# Patient Record
Sex: MALE | Race: WHITE | Employment: FULL TIME | URBAN - METROPOLITAN AREA
[De-identification: names, ages, dates, MRNs, and addresses within clinical notes are randomized per-mention and may not be internally consistent; named-entity substitution may affect disease eponyms.]

---

## 2018-12-20 ENCOUNTER — HOSPITAL ENCOUNTER (OUTPATIENT)
Age: 38
Discharge: HOME | End: 2018-12-20

## 2018-12-20 ENCOUNTER — HOSPITAL ENCOUNTER (OUTPATIENT)
Dept: GENERAL RADIOLOGY | Age: 38
Discharge: HOME | End: 2018-12-20

## 2018-12-20 DIAGNOSIS — Z00.00 PHYSICAL EXAM: ICD-10-CM

## 2018-12-20 LAB
ALBUMIN SERPL-MCNC: 4.6 G/DL (ref 3.5–5.1)
ALP BLD-CCNC: 67 U/L (ref 38–126)
ALT SERPL-CCNC: 44 U/L (ref 11–66)
AST SERPL-CCNC: 31 U/L (ref 5–40)
BACTERIA: ABNORMAL
BASOPHILS # BLD: 1.1 %
BASOPHILS ABSOLUTE: 0.1 THOU/MM3 (ref 0–0.1)
BILIRUB SERPL-MCNC: 0.4 MG/DL (ref 0.3–1.2)
BILIRUBIN URINE: ABNORMAL
BLOOD, URINE: NEGATIVE
BUN BLDV-MCNC: 19 MG/DL (ref 7–22)
CALCIUM SERPL-MCNC: 9.8 MG/DL (ref 8.5–10.5)
CASTS: ABNORMAL /LPF
CASTS: ABNORMAL /LPF
CHARACTER, URINE: CLEAR
CHLORIDE BLD-SCNC: 100 MEQ/L (ref 98–111)
CHOLESTEROL, TOTAL: 179 MG/DL (ref 100–199)
CO2: 26 MEQ/L (ref 23–33)
COLOR: YELLOW
CREAT SERPL-MCNC: 0.8 MG/DL (ref 0.4–1.2)
CRYSTALS: ABNORMAL
EOSINOPHIL # BLD: 1.3 %
EOSINOPHILS ABSOLUTE: 0.1 THOU/MM3 (ref 0–0.4)
EPITHELIAL CELLS, UA: ABNORMAL /HPF
ERYTHROCYTE [DISTWIDTH] IN BLOOD BY AUTOMATED COUNT: 12.4 % (ref 11.5–14.5)
ERYTHROCYTE [DISTWIDTH] IN BLOOD BY AUTOMATED COUNT: 41.2 FL (ref 35–45)
GLUCOSE BLD-MCNC: 84 MG/DL (ref 70–108)
GLUCOSE, URINE: NEGATIVE MG/DL
HCT VFR BLD CALC: 49.9 % (ref 42–52)
HDLC SERPL-MCNC: 38 MG/DL
HEMOGLOBIN: 17 GM/DL (ref 14–18)
IMMATURE GRANS (ABS): 0.01 THOU/MM3 (ref 0–0.07)
IMMATURE GRANULOCYTES: 0.2 %
KETONES, URINE: NEGATIVE
LDL CHOLESTEROL CALCULATED: 125 MG/DL
LEUKOCYTE ESTERASE, URINE: NEGATIVE
LYMPHOCYTES # BLD: 37.9 %
LYMPHOCYTES ABSOLUTE: 1.8 THOU/MM3 (ref 1–4.8)
MCH RBC QN AUTO: 31.1 PG (ref 26–33)
MCHC RBC AUTO-ENTMCNC: 34.1 GM/DL (ref 32.2–35.5)
MCV RBC AUTO: 91.2 FL (ref 80–94)
MISCELLANEOUS LAB TEST RESULT: ABNORMAL
MONOCYTES # BLD: 10.1 %
MONOCYTES ABSOLUTE: 0.5 THOU/MM3 (ref 0.4–1.3)
NITRITE, URINE: NEGATIVE
NUCLEATED RED BLOOD CELLS: 0 /100 WBC
PH UA: 5.5
PLATELET # BLD: 233 THOU/MM3 (ref 130–400)
PMV BLD AUTO: 9.9 FL (ref 9.4–12.4)
POTASSIUM SERPL-SCNC: 4.5 MEQ/L (ref 3.5–5.2)
PROSTATE SPECIFIC ANTIGEN: 0.5 NG/ML (ref 0–1)
PROTEIN UA: NEGATIVE MG/DL
RBC # BLD: 5.47 MILL/MM3 (ref 4.7–6.1)
RBC URINE: ABNORMAL /HPF
RENAL EPITHELIAL, UA: ABNORMAL
SEG NEUTROPHILS: 49.4 %
SEGMENTED NEUTROPHILS ABSOLUTE COUNT: 2.4 THOU/MM3 (ref 1.8–7.7)
SODIUM BLD-SCNC: 140 MEQ/L (ref 135–145)
SPECIFIC GRAVITY UA: 1.02 (ref 1–1.03)
TOTAL PROTEIN: 7.9 G/DL (ref 6.1–8)
TRIGL SERPL-MCNC: 79 MG/DL (ref 0–199)
UROBILINOGEN, URINE: 0.2 EU/DL
WBC # BLD: 4.8 THOU/MM3 (ref 4.8–10.8)
WBC UA: ABNORMAL /HPF
YEAST: ABNORMAL

## 2019-05-01 ENCOUNTER — HOSPITAL ENCOUNTER (OUTPATIENT)
Age: 39
Setting detail: OBSERVATION
Discharge: HOME | End: 2019-05-02
Attending: EMERGENCY MEDICINE | Admitting: UROLOGY
Payer: COMMERCIAL

## 2019-05-01 ENCOUNTER — APPOINTMENT (OUTPATIENT)
Dept: CT IMAGING | Age: 39
End: 2019-05-01
Payer: COMMERCIAL

## 2019-05-01 DIAGNOSIS — N20.0 KIDNEY STONE: Primary | ICD-10-CM

## 2019-05-01 DIAGNOSIS — N39.0 URINARY TRACT INFECTION WITHOUT HEMATURIA, SITE UNSPECIFIED: ICD-10-CM

## 2019-05-01 LAB
ALBUMIN SERPL-MCNC: 4.2 G/DL (ref 3.5–5.1)
ALP BLD-CCNC: 69 U/L (ref 38–126)
ALT SERPL-CCNC: 41 U/L (ref 11–66)
AMORPHOUS: ABNORMAL
AMPHETAMINE+METHAMPHETAMINE URINE SCREEN: NEGATIVE
ANION GAP SERPL CALCULATED.3IONS-SCNC: 15 MEQ/L (ref 8–16)
AST SERPL-CCNC: 31 U/L (ref 5–40)
BACTERIA: ABNORMAL /HPF
BARBITURATE QUANTITATIVE URINE: NEGATIVE
BASOPHILS # BLD: 0.7 %
BASOPHILS ABSOLUTE: 0.1 THOU/MM3 (ref 0–0.1)
BENZODIAZEPINE QUANTITATIVE URINE: NEGATIVE
BILIRUB SERPL-MCNC: 0.4 MG/DL (ref 0.3–1.2)
BILIRUBIN DIRECT: < 0.2 MG/DL (ref 0–0.3)
BILIRUBIN URINE: NEGATIVE
BLOOD, URINE: NEGATIVE
BUN BLDV-MCNC: 21 MG/DL (ref 7–22)
CALCIUM SERPL-MCNC: 9.2 MG/DL (ref 8.5–10.5)
CANNABINOID QUANTITATIVE URINE: NEGATIVE
CASTS 2: ABNORMAL /LPF
CASTS UA: ABNORMAL /LPF
CHARACTER, URINE: CLEAR
CHLORIDE BLD-SCNC: 104 MEQ/L (ref 98–111)
CO2: 20 MEQ/L (ref 23–33)
COCAINE METABOLITE QUANTITATIVE URINE: NEGATIVE
COLOR: YELLOW
CREAT SERPL-MCNC: 0.9 MG/DL (ref 0.4–1.2)
CRYSTALS, UA: ABNORMAL
EOSINOPHIL # BLD: 0.8 %
EOSINOPHILS ABSOLUTE: 0.1 THOU/MM3 (ref 0–0.4)
EPITHELIAL CELLS, UA: ABNORMAL /HPF
ERYTHROCYTE [DISTWIDTH] IN BLOOD BY AUTOMATED COUNT: 12.3 % (ref 11.5–14.5)
ERYTHROCYTE [DISTWIDTH] IN BLOOD BY AUTOMATED COUNT: 40.1 FL (ref 35–45)
GFR SERPL CREATININE-BSD FRML MDRD: > 90 ML/MIN/1.73M2
GLUCOSE BLD-MCNC: 115 MG/DL (ref 70–108)
GLUCOSE URINE: NEGATIVE MG/DL
HCT VFR BLD CALC: 46.6 % (ref 42–52)
HEMOGLOBIN: 16.2 GM/DL (ref 14–18)
IMMATURE GRANS (ABS): 0.01 THOU/MM3 (ref 0–0.07)
IMMATURE GRANULOCYTES: 0.1 %
KETONES, URINE: NEGATIVE
LEUKOCYTE ESTERASE, URINE: ABNORMAL
LIPASE: 29.2 U/L (ref 5.6–51.3)
LYMPHOCYTES # BLD: 42 %
LYMPHOCYTES ABSOLUTE: 3.2 THOU/MM3 (ref 1–4.8)
MAGNESIUM: 1.9 MG/DL (ref 1.6–2.4)
MCH RBC QN AUTO: 30.9 PG (ref 26–33)
MCHC RBC AUTO-ENTMCNC: 34.8 GM/DL (ref 32.2–35.5)
MCV RBC AUTO: 88.8 FL (ref 80–94)
MISCELLANEOUS 2: ABNORMAL
MONOCYTES # BLD: 8.5 %
MONOCYTES ABSOLUTE: 0.7 THOU/MM3 (ref 0.4–1.3)
MUCUS: ABNORMAL
NITRITE, URINE: NEGATIVE
NUCLEATED RED BLOOD CELLS: 0 /100 WBC
OPIATES, URINE: POSITIVE
OSMOLALITY CALCULATION: 281.4 MOSMOL/KG (ref 275–300)
OXYCODONE: NEGATIVE
PH UA: 6 (ref 5–9)
PHENCYCLIDINE QUANTITATIVE URINE: NEGATIVE
PLATELET # BLD: 258 THOU/MM3 (ref 130–400)
PMV BLD AUTO: 9.7 FL (ref 9.4–12.4)
POTASSIUM SERPL-SCNC: 3.7 MEQ/L (ref 3.5–5.2)
PROTEIN UA: NEGATIVE
RBC # BLD: 5.25 MILL/MM3 (ref 4.7–6.1)
RBC URINE: ABNORMAL /HPF
RENAL EPITHELIAL, UA: ABNORMAL
SEG NEUTROPHILS: 47.9 %
SEGMENTED NEUTROPHILS ABSOLUTE COUNT: 3.7 THOU/MM3 (ref 1.8–7.7)
SODIUM BLD-SCNC: 139 MEQ/L (ref 135–145)
SPECIFIC GRAVITY, URINE: 1.03 (ref 1–1.03)
TOTAL PROTEIN: 7.4 G/DL (ref 6.1–8)
UROBILINOGEN, URINE: 0.2 EU/DL (ref 0–1)
WBC # BLD: 7.7 THOU/MM3 (ref 4.8–10.8)
WBC UA: ABNORMAL /HPF
YEAST: ABNORMAL

## 2019-05-01 PROCEDURE — 96376 TX/PRO/DX INJ SAME DRUG ADON: CPT

## 2019-05-01 PROCEDURE — 6370000000 HC RX 637 (ALT 250 FOR IP): Performed by: NURSE PRACTITIONER

## 2019-05-01 PROCEDURE — G0378 HOSPITAL OBSERVATION PER HR: HCPCS

## 2019-05-01 PROCEDURE — 83735 ASSAY OF MAGNESIUM: CPT

## 2019-05-01 PROCEDURE — 6360000002 HC RX W HCPCS: Performed by: PHYSICIAN ASSISTANT

## 2019-05-01 PROCEDURE — 96365 THER/PROPH/DIAG IV INF INIT: CPT

## 2019-05-01 PROCEDURE — 2580000003 HC RX 258: Performed by: NURSE PRACTITIONER

## 2019-05-01 PROCEDURE — 74176 CT ABD & PELVIS W/O CONTRAST: CPT

## 2019-05-01 PROCEDURE — 80048 BASIC METABOLIC PNL TOTAL CA: CPT

## 2019-05-01 PROCEDURE — 81001 URINALYSIS AUTO W/SCOPE: CPT

## 2019-05-01 PROCEDURE — 36415 COLL VENOUS BLD VENIPUNCTURE: CPT

## 2019-05-01 PROCEDURE — 80076 HEPATIC FUNCTION PANEL: CPT

## 2019-05-01 PROCEDURE — 2580000003 HC RX 258: Performed by: PHYSICIAN ASSISTANT

## 2019-05-01 PROCEDURE — 80307 DRUG TEST PRSMV CHEM ANLYZR: CPT

## 2019-05-01 PROCEDURE — 83690 ASSAY OF LIPASE: CPT

## 2019-05-01 PROCEDURE — 96375 TX/PRO/DX INJ NEW DRUG ADDON: CPT

## 2019-05-01 PROCEDURE — 99285 EMERGENCY DEPT VISIT HI MDM: CPT

## 2019-05-01 PROCEDURE — 6360000002 HC RX W HCPCS: Performed by: NURSE PRACTITIONER

## 2019-05-01 PROCEDURE — 96374 THER/PROPH/DIAG INJ IV PUSH: CPT

## 2019-05-01 PROCEDURE — 2709999900 HC NON-CHARGEABLE SUPPLY

## 2019-05-01 PROCEDURE — 96361 HYDRATE IV INFUSION ADD-ON: CPT

## 2019-05-01 PROCEDURE — 85025 COMPLETE CBC W/AUTO DIFF WBC: CPT

## 2019-05-01 RX ORDER — ONDANSETRON 2 MG/ML
4 INJECTION INTRAMUSCULAR; INTRAVENOUS ONCE
Status: COMPLETED | OUTPATIENT
Start: 2019-05-01 | End: 2019-05-01

## 2019-05-01 RX ORDER — SULFAMETHOXAZOLE AND TRIMETHOPRIM 800; 160 MG/1; MG/1
1 TABLET ORAL 2 TIMES DAILY
Qty: 14 TABLET | Refills: 0 | Status: SHIPPED | OUTPATIENT
Start: 2019-05-01 | End: 2019-05-02 | Stop reason: DRUGHIGH

## 2019-05-01 RX ORDER — SODIUM CHLORIDE 0.9 % (FLUSH) 0.9 %
10 SYRINGE (ML) INJECTION PRN
Status: DISCONTINUED | OUTPATIENT
Start: 2019-05-01 | End: 2019-05-02 | Stop reason: HOSPADM

## 2019-05-01 RX ORDER — SODIUM CHLORIDE 0.9 % (FLUSH) 0.9 %
10 SYRINGE (ML) INJECTION EVERY 12 HOURS SCHEDULED
Status: DISCONTINUED | OUTPATIENT
Start: 2019-05-01 | End: 2019-05-02 | Stop reason: HOSPADM

## 2019-05-01 RX ORDER — SODIUM CHLORIDE 9 MG/ML
INJECTION, SOLUTION INTRAVENOUS CONTINUOUS
Status: DISCONTINUED | OUTPATIENT
Start: 2019-05-01 | End: 2019-05-02 | Stop reason: HOSPADM

## 2019-05-01 RX ORDER — ONDANSETRON 4 MG/1
4 TABLET, ORALLY DISINTEGRATING ORAL EVERY 8 HOURS PRN
Qty: 20 TABLET | Refills: 0 | Status: SHIPPED | OUTPATIENT
Start: 2019-05-01 | End: 2021-11-10 | Stop reason: ALTCHOICE

## 2019-05-01 RX ORDER — KETOROLAC TROMETHAMINE 30 MG/ML
30 INJECTION, SOLUTION INTRAMUSCULAR; INTRAVENOUS ONCE
Status: COMPLETED | OUTPATIENT
Start: 2019-05-01 | End: 2019-05-01

## 2019-05-01 RX ORDER — MORPHINE SULFATE 4 MG/ML
4 INJECTION, SOLUTION INTRAMUSCULAR; INTRAVENOUS ONCE
Status: COMPLETED | OUTPATIENT
Start: 2019-05-01 | End: 2019-05-01

## 2019-05-01 RX ORDER — OXYCODONE HYDROCHLORIDE AND ACETAMINOPHEN 5; 325 MG/1; MG/1
1 TABLET ORAL EVERY 6 HOURS PRN
Qty: 12 TABLET | Refills: 0 | Status: SHIPPED | OUTPATIENT
Start: 2019-05-01 | End: 2019-05-02 | Stop reason: DRUGHIGH

## 2019-05-01 RX ORDER — HYDROCODONE BITARTRATE AND ACETAMINOPHEN 5; 325 MG/1; MG/1
2 TABLET ORAL EVERY 4 HOURS PRN
Status: DISCONTINUED | OUTPATIENT
Start: 2019-05-01 | End: 2019-05-02 | Stop reason: HOSPADM

## 2019-05-01 RX ORDER — KETOROLAC TROMETHAMINE 10 MG/1
10 TABLET, FILM COATED ORAL EVERY 6 HOURS PRN
Qty: 20 TABLET | Refills: 0 | Status: SHIPPED | OUTPATIENT
Start: 2019-05-01 | End: 2019-05-02 | Stop reason: DRUGHIGH

## 2019-05-01 RX ORDER — MORPHINE SULFATE 4 MG/ML
4 INJECTION, SOLUTION INTRAMUSCULAR; INTRAVENOUS
Status: DISCONTINUED | OUTPATIENT
Start: 2019-05-01 | End: 2019-05-02 | Stop reason: HOSPADM

## 2019-05-01 RX ORDER — MORPHINE SULFATE 2 MG/ML
2 INJECTION, SOLUTION INTRAMUSCULAR; INTRAVENOUS
Status: DISCONTINUED | OUTPATIENT
Start: 2019-05-01 | End: 2019-05-02 | Stop reason: HOSPADM

## 2019-05-01 RX ORDER — MORPHINE SULFATE 2 MG/ML
2 INJECTION, SOLUTION INTRAMUSCULAR; INTRAVENOUS ONCE
Status: COMPLETED | OUTPATIENT
Start: 2019-05-01 | End: 2019-05-01

## 2019-05-01 RX ORDER — HYDROCODONE BITARTRATE AND ACETAMINOPHEN 5; 325 MG/1; MG/1
1 TABLET ORAL EVERY 4 HOURS PRN
Status: DISCONTINUED | OUTPATIENT
Start: 2019-05-01 | End: 2019-05-02 | Stop reason: HOSPADM

## 2019-05-01 RX ORDER — ONDANSETRON 2 MG/ML
4 INJECTION INTRAMUSCULAR; INTRAVENOUS EVERY 6 HOURS PRN
Status: DISCONTINUED | OUTPATIENT
Start: 2019-05-01 | End: 2019-05-02 | Stop reason: HOSPADM

## 2019-05-01 RX ORDER — TAMSULOSIN HYDROCHLORIDE 0.4 MG/1
0.4 CAPSULE ORAL DAILY
Qty: 5 CAPSULE | Refills: 0 | Status: SHIPPED | OUTPATIENT
Start: 2019-05-01 | End: 2019-05-02 | Stop reason: SDUPTHER

## 2019-05-01 RX ORDER — 0.9 % SODIUM CHLORIDE 0.9 %
1000 INTRAVENOUS SOLUTION INTRAVENOUS ONCE
Status: COMPLETED | OUTPATIENT
Start: 2019-05-01 | End: 2019-05-01

## 2019-05-01 RX ORDER — TAMSULOSIN HYDROCHLORIDE 0.4 MG/1
0.4 CAPSULE ORAL DAILY
Status: DISCONTINUED | OUTPATIENT
Start: 2019-05-01 | End: 2019-05-02 | Stop reason: HOSPADM

## 2019-05-01 RX ORDER — ACETAMINOPHEN 325 MG/1
650 TABLET ORAL EVERY 4 HOURS PRN
Status: DISCONTINUED | OUTPATIENT
Start: 2019-05-01 | End: 2019-05-02 | Stop reason: HOSPADM

## 2019-05-01 RX ADMIN — SODIUM CHLORIDE 1000 ML: 9 INJECTION, SOLUTION INTRAVENOUS at 12:54

## 2019-05-01 RX ADMIN — SODIUM CHLORIDE: 9 INJECTION, SOLUTION INTRAVENOUS at 17:21

## 2019-05-01 RX ADMIN — ONDANSETRON 4 MG: 2 INJECTION INTRAMUSCULAR; INTRAVENOUS at 12:01

## 2019-05-01 RX ADMIN — HYDROCODONE BITARTRATE AND ACETAMINOPHEN 1 TABLET: 5; 325 TABLET ORAL at 19:46

## 2019-05-01 RX ADMIN — HYDROMORPHONE HYDROCHLORIDE 1 MG: 1 INJECTION, SOLUTION INTRAMUSCULAR; INTRAVENOUS; SUBCUTANEOUS at 14:45

## 2019-05-01 RX ADMIN — TAMSULOSIN HYDROCHLORIDE 0.4 MG: 0.4 CAPSULE ORAL at 17:29

## 2019-05-01 RX ADMIN — ONDANSETRON 4 MG: 2 INJECTION INTRAMUSCULAR; INTRAVENOUS at 13:03

## 2019-05-01 RX ADMIN — MORPHINE SULFATE 2 MG: 2 INJECTION, SOLUTION INTRAMUSCULAR; INTRAVENOUS at 12:01

## 2019-05-01 RX ADMIN — KETOROLAC TROMETHAMINE 30 MG: 30 INJECTION, SOLUTION INTRAMUSCULAR at 12:01

## 2019-05-01 RX ADMIN — MORPHINE SULFATE 4 MG: 4 INJECTION INTRAVENOUS at 13:03

## 2019-05-01 RX ADMIN — CEFTRIAXONE SODIUM 1 G: 1 INJECTION, POWDER, FOR SOLUTION INTRAMUSCULAR; INTRAVENOUS at 19:47

## 2019-05-01 ASSESSMENT — PAIN DESCRIPTION - DESCRIPTORS
DESCRIPTORS: SHARP

## 2019-05-01 ASSESSMENT — ENCOUNTER SYMPTOMS
EYE DISCHARGE: 0
RHINORRHEA: 0
SHORTNESS OF BREATH: 0
COUGH: 0
DIARRHEA: 0
WHEEZING: 0
NAUSEA: 0
ABDOMINAL PAIN: 0
EYE REDNESS: 0
VOMITING: 0
SORE THROAT: 0
BACK PAIN: 0

## 2019-05-01 ASSESSMENT — PAIN SCALES - GENERAL
PAINLEVEL_OUTOF10: 0
PAINLEVEL_OUTOF10: 0
PAINLEVEL_OUTOF10: 8
PAINLEVEL_OUTOF10: 10
PAINLEVEL_OUTOF10: 8
PAINLEVEL_OUTOF10: 6
PAINLEVEL_OUTOF10: 8
PAINLEVEL_OUTOF10: 10
PAINLEVEL_OUTOF10: 2
PAINLEVEL_OUTOF10: 0

## 2019-05-01 ASSESSMENT — PAIN DESCRIPTION - FREQUENCY
FREQUENCY: INTERMITTENT
FREQUENCY: CONTINUOUS

## 2019-05-01 ASSESSMENT — PAIN - FUNCTIONAL ASSESSMENT: PAIN_FUNCTIONAL_ASSESSMENT: ACTIVITIES ARE NOT PREVENTED

## 2019-05-01 ASSESSMENT — PAIN DESCRIPTION - PAIN TYPE
TYPE: ACUTE PAIN

## 2019-05-01 ASSESSMENT — PAIN DESCRIPTION - ONSET
ONSET: SUDDEN
ONSET: GRADUAL
ONSET: SUDDEN

## 2019-05-01 ASSESSMENT — PAIN DESCRIPTION - PROGRESSION
CLINICAL_PROGRESSION: GRADUALLY IMPROVING
CLINICAL_PROGRESSION: GRADUALLY WORSENING
CLINICAL_PROGRESSION: GRADUALLY WORSENING

## 2019-05-01 ASSESSMENT — PAIN DESCRIPTION - ORIENTATION
ORIENTATION: RIGHT

## 2019-05-01 ASSESSMENT — PAIN DESCRIPTION - LOCATION
LOCATION: FLANK

## 2019-05-01 NOTE — ED NOTES
Pt admitted to hospital. Transported to floor by cart in stable condition. Notified floor of transport.        Janes Maurer List, LPN  59/34/47 9100

## 2019-05-01 NOTE — ED NOTES
Pt sitting in chair attempting to find a comfortable position and informed of further medications ordered. Pt's pain remains 8/10. Pt reports that KOKO Quiñonez considered admitting for pain control and POC was to assess pain after dilaudid was provided. Will return to reassess.      Sylvester Finney RN  05/01/19 2475

## 2019-05-01 NOTE — PROGRESS NOTES
Patient admitted with history of pain in right flank that started at noon time today and nausea. Patient denies any pain or nausea at present. INT noted in left antecubital. Patient and spouse oriented to room and physician orders reviewed.  Bed alarm on

## 2019-05-01 NOTE — ED TRIAGE NOTES
Pt to ed with c/o severe left sided flank pain that started suddenly 30 minutes prior to arrival. Pt rates pain 10/10 and denies that the pain radiates and reports that he has never experienced pain like this before. Pt appears in distress unable to find a comfortable position. Pt is diaphoretic and appears pale. Lung sounds clear and labored due to pain. Bowel sounds active and pt denies tenderness. KOKO Worrell already assessed pt and orders obtained. Iv established and pt medicated for 10/10 flank pain. Will return for reassessment of flank pain. Call light in reach with sr up x2 and family/friend at the bedside.

## 2019-05-01 NOTE — ED NOTES
Pt informed that a urine sample was also needed and provided with a urinal to obtain.      Sam Biswas RN  05/01/19 8933

## 2019-05-01 NOTE — ED PROVIDER NOTES
[facility]  eMERGENCY dEPARTMENT eNCOUnter          [address] [facility]       Chief Complaint   Patient presents with    Back Pain    Flank Pain       Nurses Notes reviewed and I agree except as noted in the HPI. HISTORY OF PRESENT ILLNESS    Costa Angel is a 34 y.o. male who presents to the Emergency Department for the evaluation of right sided flank pain that began 15-20 minutes prior to arrival. The patient denies testicular pain, nausea, and vomiting. He states he was attempting to urinate when the pain onset suddenly. She states it is a sharp pain. He denies experiencing a pain like this prior. The patient did not state any other complaints or symptoms during my initial encounter. The HPI was provided by the patient. REVIEW OF SYSTEMS     Review of Systems   Constitutional: Negative for appetite change, chills, fatigue and fever. HENT: Negative for congestion, ear pain, rhinorrhea and sore throat. Eyes: Negative for discharge, redness and visual disturbance. Respiratory: Negative for cough, shortness of breath and wheezing. Cardiovascular: Negative for chest pain, palpitations and leg swelling. Gastrointestinal: Negative for abdominal pain, diarrhea, nausea and vomiting. Genitourinary: Positive for flank pain (right sided, onset sudden, sharp). Negative for decreased urine volume, difficulty urinating, dysuria and hematuria. Musculoskeletal: Negative for arthralgias, back pain, joint swelling and neck pain. Skin: Negative for pallor and rash. Allergic/Immunologic: Negative for environmental allergies. Neurological: Negative for dizziness, syncope, weakness, light-headedness, numbness and headaches. Hematological: Negative for adenopathy. Psychiatric/Behavioral: Negative for confusion and suicidal ideas. The patient is not nervous/anxious. PAST MEDICAL HISTORY    has no past medical history on file.     SURGICAL HISTORY      has a past surgical history that includes other surgical history (11/26/13); Vasectomy (2016); Cholecystectomy; Urethra surgery; and Endoscopy, colon, diagnostic (2016). CURRENT MEDICATIONS       Current Discharge Medication List      CONTINUE these medications which have NOT CHANGED    Details   vitamin D (CHOLECALCIFEROL) 1000 UNIT TABS tablet Take by mouth daily Indications: 5000 untis daily             ALLERGIES     is allergic to wheat bran. FAMILY HISTORY     indicated that his mother is alive. He indicated that his father is alive. He indicated that the status of his sister is unknown. He indicated that the status of his maternal grandfather is unknown. He indicated that the status of his paternal grandmother is unknown. He indicated that the status of his daughter is unknown. He indicated that the status of his paternal uncle is unknown. He indicated that the status of his neg hx is unknown.   family history includes Alcohol Abuse in his paternal grandmother; Arthritis in his father, mother, and sister; Coronary Art Dis in his paternal grandmother; Diabetes in his paternal grandmother and paternal uncle; Hearing Loss in his mother; Heart Disease in his paternal grandmother; High Blood Pressure in his father and mother; Learning Disabilities in his daughter; Pasquale Balloon / Jacqualine Hickory in his mother and sister; Stroke in his maternal grandfather. SOCIAL HISTORY      reports that he has never smoked. He has quit using smokeless tobacco. His smokeless tobacco use included chew. He reports that he does not drink alcohol or use drugs. PHYSICAL EXAM     INITIAL VITALS:  height is 6' 1\" (1.854 m) and weight is 210 lb 2 oz (95.3 kg). His oral temperature is 96.6 °F (35.9 °C). His blood pressure is 123/73 and his pulse is 73. His respiration is 16 and oxygen saturation is 98%. Physical Exam   Constitutional: He is oriented to person, place, and time. He appears well-developed and well-nourished. Non-toxic appearance. HENT:   Head: Normocephalic and atraumatic. Right Ear: Tympanic membrane and external ear normal.   Left Ear: Tympanic membrane and external ear normal.   Nose: Nose normal.   Mouth/Throat: Oropharynx is clear and moist and mucous membranes are normal. No oropharyngeal exudate, posterior oropharyngeal edema or posterior oropharyngeal erythema. Eyes: Conjunctivae and EOM are normal.   Neck: Normal range of motion. Neck supple. No JVD present. Cardiovascular: Normal rate, regular rhythm, normal heart sounds, intact distal pulses and normal pulses. Exam reveals no gallop and no friction rub. No murmur heard. Pulmonary/Chest: Effort normal and breath sounds normal. No respiratory distress. He has no decreased breath sounds. He has no wheezes. He has no rhonchi. He has no rales. Abdominal: Soft. Bowel sounds are normal. He exhibits no distension. There is no tenderness. There is CVA tenderness (right side). There is no rebound and no guarding. Musculoskeletal: Normal range of motion. He exhibits no edema. Neurological: He is alert and oriented to person, place, and time. He exhibits normal muscle tone. Coordination normal.   Skin: Skin is warm and dry. No rash noted. He is not diaphoretic. Nursing note and vitals reviewed. DIFFERENTIAL DIAGNOSIS:   Kidney stone, UTI, musculoskeletal pain    DIAGNOSTIC RESULTS     EKG: All EKG's are interpreted by the Emergency Department Physician who either signs or Co-signs this chart in the absence of a cardiologist.    None    RADIOLOGY: non-plainfilm images(s) such as CT, Ultrasound and MRI are read by the radiologist.    CT ABDOMEN PELVIS WO CONTRAST Additional Contrast? None   Final Result      1. There is right hydronephrosis and hydroureter with obstructing 3 mm right UVJ stone. No bladder wall thickening. Correlation with urinalysis is advised. 2. Moderate stool in the colon.  Apparent thickening of the rectum probable under distention, mild and hydroureter with obstructing 3 mm right UVJ stone; No bladder wall thickening. Laboratory work was reassuring with the only findings being trace levels of leukocytes in the urine and a negative drug screen with the exception of the opiate positive from administered medications within the ED. I offered the patient admission, which the patient declined. I consulted Halima Hall CNP who recommended the patient follow up with the clinic tomorrow. Within the department, the patient was treated with Dilaudid, morphinex2, Zofranx2, Toradol, and IV fluids for pain management. I observed the patient's condition to remain stable during the duration of his stay. I explained my proposed course of treatment to the patient, who was amenable to my decision, and I answered all questions that were asked. He was discharged home in stable condition with prescriptions for Bactrim-DS, Percocet, Zofran, Toradol, and Flomax, and the patient will return to the ED if his symptoms become more severe in nature or otherwise change. I advised the patient to follow-up with [facility] tomorrow. I also discussed return to ED precautions with the patient who verbalized understanding. CRITICAL CARE:   None     CONSULTS:  Halima Hall CNP    PROCEDURES:  None    FINAL IMPRESSION      1. Kidney stone    2. Urinary tract infection without hematuria, site unspecified          DISPOSITION/PLAN   Discharge    PATIENT REFERRED TO:  [facility]  [address] [place] [address]  In 1 day      Adela Coon MD  [address] [place] [address] [id number] [phone]            DISCHARGE MEDICATIONS:  Current Discharge Medication List      START taking these medications    Details   sulfamethoxazole-trimethoprim (BACTRIM DS) 800-160 MG per tablet Take 1 tablet by mouth 2 times daily for 7 days  Qty: 14 tablet, Refills: 0      oxyCODONE-acetaminophen (PERCOCET) 5-325 MG per tablet Take 1 tablet by mouth every

## 2019-05-01 NOTE — ED NOTES
Pt resting in bed reports slight change in pain 6/10 however pt and significant other are requesting admission due to pain. KOKO Bell to be notified.      Elizabeth Nova RN  05/01/19 [id number] n/a

## 2019-05-01 NOTE — ED NOTES
Upon entering room pt appears in pain unable to find a position of comfort. Pt requesting to sit in the chair at the bedside and sr was put down so pt could move around. Pt reports that the severity of the pain is intermittent. Vs reassessed and stable and pt currently reports that pain is 8/10. Pt and family voice no concerns and or needs.  Will continue to monitor     Sweetie Tejada RN  05/01/19 1118

## 2019-05-02 VITALS
WEIGHT: 210.13 LBS | HEIGHT: 73 IN | RESPIRATION RATE: 16 BRPM | DIASTOLIC BLOOD PRESSURE: 78 MMHG | TEMPERATURE: 97.8 F | BODY MASS INDEX: 27.85 KG/M2 | HEART RATE: 65 BPM | SYSTOLIC BLOOD PRESSURE: 116 MMHG | OXYGEN SATURATION: 93 %

## 2019-05-02 DIAGNOSIS — N20.0 KIDNEY STONE: ICD-10-CM

## 2019-05-02 LAB
ANION GAP SERPL CALCULATED.3IONS-SCNC: 9 MEQ/L (ref 8–16)
BUN BLDV-MCNC: 15 MG/DL (ref 7–22)
CALCIUM SERPL-MCNC: 8.3 MG/DL (ref 8.5–10.5)
CHLORIDE BLD-SCNC: 108 MEQ/L (ref 98–111)
CO2: 24 MEQ/L (ref 23–33)
CREAT SERPL-MCNC: 0.6 MG/DL (ref 0.4–1.2)
ERYTHROCYTE [DISTWIDTH] IN BLOOD BY AUTOMATED COUNT: 12.5 % (ref 11.5–14.5)
ERYTHROCYTE [DISTWIDTH] IN BLOOD BY AUTOMATED COUNT: 40.8 FL (ref 35–45)
GFR SERPL CREATININE-BSD FRML MDRD: > 90 ML/MIN/1.73M2
GLUCOSE BLD-MCNC: 89 MG/DL (ref 70–108)
HCT VFR BLD CALC: 41.4 % (ref 42–52)
HEMOGLOBIN: 14.2 GM/DL (ref 14–18)
MCH RBC QN AUTO: 30.9 PG (ref 26–33)
MCHC RBC AUTO-ENTMCNC: 34.3 GM/DL (ref 32.2–35.5)
MCV RBC AUTO: 90 FL (ref 80–94)
PLATELET # BLD: 174 THOU/MM3 (ref 130–400)
PMV BLD AUTO: 10.1 FL (ref 9.4–12.4)
POTASSIUM REFLEX MAGNESIUM: 3.6 MEQ/L (ref 3.5–5.2)
RBC # BLD: 4.6 MILL/MM3 (ref 4.7–6.1)
SODIUM BLD-SCNC: 141 MEQ/L (ref 135–145)
WBC # BLD: 6.6 THOU/MM3 (ref 4.8–10.8)

## 2019-05-02 PROCEDURE — 85027 COMPLETE CBC AUTOMATED: CPT

## 2019-05-02 PROCEDURE — 6370000000 HC RX 637 (ALT 250 FOR IP): Performed by: NURSE PRACTITIONER

## 2019-05-02 PROCEDURE — G0378 HOSPITAL OBSERVATION PER HR: HCPCS

## 2019-05-02 PROCEDURE — 96361 HYDRATE IV INFUSION ADD-ON: CPT

## 2019-05-02 PROCEDURE — 2709999900 HC NON-CHARGEABLE SUPPLY

## 2019-05-02 PROCEDURE — 99220 PR INITIAL OBSERVATION CARE/DAY 70 MINUTES: CPT | Performed by: NURSE PRACTITIONER

## 2019-05-02 PROCEDURE — 99999 PR OFFICE/OUTPT VISIT,PROCEDURE ONLY: CPT | Performed by: NURSE PRACTITIONER

## 2019-05-02 PROCEDURE — 2580000003 HC RX 258: Performed by: NURSE PRACTITIONER

## 2019-05-02 PROCEDURE — 36415 COLL VENOUS BLD VENIPUNCTURE: CPT

## 2019-05-02 PROCEDURE — 80048 BASIC METABOLIC PNL TOTAL CA: CPT

## 2019-05-02 RX ORDER — OXYCODONE HYDROCHLORIDE AND ACETAMINOPHEN 5; 325 MG/1; MG/1
1 TABLET ORAL EVERY 6 HOURS PRN
Qty: 12 TABLET | Refills: 0 | Status: SHIPPED | OUTPATIENT
Start: 2019-05-02 | End: 2019-05-05

## 2019-05-02 RX ORDER — TAMSULOSIN HYDROCHLORIDE 0.4 MG/1
0.4 CAPSULE ORAL DAILY
Qty: 10 CAPSULE | Refills: 0 | Status: SHIPPED | OUTPATIENT
Start: 2019-05-02 | End: 2019-05-02 | Stop reason: DRUGHIGH

## 2019-05-02 RX ORDER — KETOROLAC TROMETHAMINE 10 MG/1
10 TABLET, FILM COATED ORAL EVERY 6 HOURS PRN
Qty: 20 TABLET | Refills: 0 | Status: SHIPPED | OUTPATIENT
Start: 2019-05-02 | End: 2022-02-09

## 2019-05-02 RX ORDER — SULFAMETHOXAZOLE AND TRIMETHOPRIM 800; 160 MG/1; MG/1
1 TABLET ORAL 2 TIMES DAILY
Qty: 14 TABLET | Refills: 0 | Status: SHIPPED | OUTPATIENT
Start: 2019-05-02 | End: 2019-05-09

## 2019-05-02 RX ORDER — TAMSULOSIN HYDROCHLORIDE 0.4 MG/1
0.4 CAPSULE ORAL DAILY
Qty: 10 CAPSULE | Refills: 0 | Status: SHIPPED | OUTPATIENT
Start: 2019-05-02 | End: 2021-11-10 | Stop reason: ALTCHOICE

## 2019-05-02 RX ADMIN — TAMSULOSIN HYDROCHLORIDE 0.4 MG: 0.4 CAPSULE ORAL at 09:46

## 2019-05-02 RX ADMIN — SODIUM CHLORIDE: 9 INJECTION, SOLUTION INTRAVENOUS at 00:01

## 2019-05-02 ASSESSMENT — PAIN SCALES - GENERAL: PAINLEVEL_OUTOF10: 0

## 2019-05-02 NOTE — PROGRESS NOTES
Discharge instructions given to pt. . Belongings packed and sent with pt. Pt verbalized understanding of instructions. Discharge teaching and instructions for diagnosis/procedure of  completed with patient using teachback method. AVS reviewed. Printed prescriptions given to patient. Patient voiced understanding regarding prescriptions, follow up appointments, and care of self at home.  Discharged in a wheelchair to  independent living per family

## 2019-05-02 NOTE — DISCHARGE INSTR - DIET

## 2019-05-02 NOTE — CARE COORDINATION
CASE MANAGEMENT OBSERVATION NOTE       5/2/19, 7:47 AM    Virgie Herrera       Admitted from: ED    5/1/2019/ 1141   Location: [address] [place] [address]- Reason for admit: Kidney stone [N20.0]  Kidney stones [N20.0]   Admit order signed?: yes    Procedure:       05/01/19  CT abdomen pelvis  1. There is right hydronephrosis and hydroureter with obstructing 3 mm right UVJ stone. No bladder wall thickening. Correlation with urinalysis is advised. 2. Moderate stool in the colon. Apparent thickening of the rectum probable under distention, mild proctitis cannot be excluded. Pertinent Info/Orders/Treatment Plan:  Strain all urine, pain and nausea management    PCP: Indra Winters MD    Discharge Plan: Met with Viki [place]; he plans home with spouse and declines needs and is independent of all SDL's. Requested RTW slip; primary nurse assisting. 5/2/19, 10:36 AM    Discharge plan discussed by  and . Discharge plan reviewed with patient/ family. Patient/ family verbalize understanding of discharge plan and are in agreement with plan. Understanding was demonstrated using the teach back method.

## 2019-05-02 NOTE — H&P
[address] [facility]  [address] [place] [address] [facility] [employer]  [address] [place] [address] [address] [address]  Dept: [phone]  Loc: [phone]  Visit Date: 5/1/2019           History & Physical    Chief Complaint: Right flank pain    HISTORY OF PRESENT ILLNESS:                The patient is a 34 y.o. male with significant past medical history as listed below who presented to the ED yesterday around 11:30 AM for sudden onset of sharp, stabbing pain in his right flank that he states was persistent in nature. CT scan revealed right hydronephrosis and hydroureter secondary to a 3 mm obstructing distal ureteral stone. He is unaware of any causing factors that contributed to his stone formation. He states his pain was 10/10 on presentation to ER and was also having significant nausea. He denies any fever, chills, sob, or chest pain. Currently he denies any pain and states the oral pain medication they gave last evening around 2300 had relieved any further pain symptoms. He would like to go home today. Mary Ann Diego is here today with his wife. History was gathered from patient and medical record. Past Medical History:    History reviewed. No pertinent past medical history.   Past Surgical History:        Procedure Laterality Date    CHOLECYSTECTOMY      ENDOSCOPY, COLON, DIAGNOSTIC  2016    Dr Shiraz Vines  11/26/13    [facility] Urethrotomy - Dr. Hazel Mota URETHRA SURGERY      tear    VASECTOMY  2016     Social History:  Social History     Socioeconomic History    Marital status:      Spouse name: Not on file    Number of children: Not on file    Years of education: Not on file    Highest education level: Not on file   Occupational History    Occupation: romero   Social Needs    Financial resource strain: Not on file    Food insecurity:     Worry: Not on file     Inability: Not on file   [employer] needs:     Medical: Not on file     Non-medical: Not on file Tobacco Use    Smoking status: Never Smoker    Smokeless tobacco: Former User     Types: Chew   Substance and Sexual Activity    Alcohol use: No    Drug use: No    Sexual activity: Not on file   Lifestyle    Physical activity:     Days per week: Not on file     Minutes per session: Not on file    Stress: Not on file   Relationships    Social connections:     Talks on phone: Not on file     Gets together: Not on file     Attends Temple service: Not on file     Active member of club or organization: Not on file     Attends meetings of clubs or organizations: Not on file     Relationship status: Not on file    Intimate partner violence:     Fear of current or ex partner: Not on file     Emotionally abused: Not on file     Physically abused: Not on file     Forced sexual activity: Not on file   Other Topics Concern    Not on file   Social History Narrative    Not on file     Family History:  AL  Family History   Problem Relation Age of Onset    High Blood Pressure Mother     Arthritis Mother     Hearing Loss Mother    [place] / [place] Mother     High Blood Pressure Father     Arthritis Father     Diabetes Paternal Uncle     Diabetes Paternal Grandmother     Alcohol Abuse Paternal Grandmother     Heart Disease Paternal Grandmother     Coronary Art Dis Paternal Grandmother     Arthritis Sister     Miscarriages / [place] Sister     Stroke Maternal Grandfather     Learning Disabilities Daughter     Allergy (Severe) Neg Hx     Anemia Neg Hx     Arrhythmia Neg Hx     Atrial Fibrillation Neg Hx     Asthma Neg Hx     Birth Defects Neg Hx     Breast Cancer Neg Hx     Colon Cancer Neg Hx     Prostate Cancer Neg Hx     Cancer Neg Hx     Depression Neg Hx     Early Death Neg Hx     Heart Attack Neg Hx     Kidney Disease Neg Hx     Mental Illness Neg Hx     Mental Retardation Neg Hx     Obesity Neg Hx     Osteoporosis Neg Hx     Substance Abuse Neg Hx     Vision Loss tenderness. Abdominal:          Soft. No tenderness. Active bowel sounds. Genitalia: External Genitalia shows no irritation or erythema   Urethral Meatus appears to be normal in size and location   Urethra is normal with no tenderness or masses  Musculoskeletal:    Normal range of motion. He exhibits no edema or tenderness of lower extremities. Extremities:    No cyanosis, clubbing, or edema present. Neurological:    Alert and oriented. No cranial nerve deficit. There are no focalizing motor or sensory deficits. DATA:  CBC:   Lab Results   Component Value Date    WBC 6.6 05/02/2019    RBC 4.60 05/02/2019    HGB 14.2 05/02/2019    HCT 41.4 05/02/2019    MCV 90.0 05/02/2019    MCH 30.9 05/02/2019    MCHC 34.3 05/02/2019    RDW 12.6 11/09/2016     05/02/2019    MPV 10.1 05/02/2019     BMP:    Lab Results   Component Value Date     05/02/2019    K 3.6 05/02/2019     05/02/2019    CO2 24 05/02/2019    BUN 15 05/02/2019    CREATININE 0.6 05/02/2019    CALCIUM 8.3 05/02/2019    LABGLOM >90 05/02/2019    GLUCOSE 89 05/02/2019     BUN/Creatinine:    Lab Results   Component Value Date    BUN 15 05/02/2019    CREATININE 0.6 05/02/2019     Magnesium:    Lab Results   Component Value Date    MG 1.9 05/01/2019     U/A:    Lab Results   Component Value Date    NITRITE neg 12/20/2013    COLORU YELLOW 05/01/2019    PHUR 6.0 05/01/2019    LABCAST 8-15 HYALINE 12/20/2018    LABCAST NONE SEEN 12/20/2018    WBCUA 5-10 05/01/2019    RBCUA 0-2 05/01/2019    MUCUS THREADS 05/01/2019    YEAST NONE SEEN 05/01/2019    BACTERIA FEW 05/01/2019    SPECGRAV 1.024 12/20/2018    LEUKOCYTESUR TRACE 05/01/2019    UROBILINOGEN 0.2 05/01/2019    BILIRUBINUR NEGATIVE 05/01/2019    BLOODU NEGATIVE 05/01/2019    GLUCOSEU NEGATIVE 05/01/2019    AMORPHOUS URATES 05/01/2019       Imaging: The patient has had a CT Stone Protocol which I have independently reviewed along with its accompanying report.   The study demonstrates:    Impression       1. There is right hydronephrosis and hydroureter with obstructing 3 mm right UVJ stone. No bladder wall thickening. Correlation with urinalysis is advised.       2. Moderate stool in the colon. Apparent thickening of the rectum probable under distention, mild proctitis cannot be excluded.                   **This report has been created using voice recognition software. It may contain minor errors which are inherent in voice recognition technology. **       Final report electronically signed by Dr. Enedina Melchor on 5/1/2019 12:41 PM         IMPRESSION:     Obstructing right ureteral calculus  Moderate right hydroureteronephrosis  Right flank pain    Plan:      Admit for pain control and observation  Start Flomax 0.4 mg PO daily  IV 0.9 NS @ 150 ml/hr  NPO after midnight    Case and imaging discussed with Dr. Mariann Nair. We discussed stone disease and the need to decrease salt intake and animal protein and the importance of increasing water intake and trying to increase urine citrate by adding lemon juice to water in order to prevent renal calculi.     Treatment options presented explained in detail:  1. Trial of natural passage   2. ESWL  3. Cystoscopy with laser lithotripsy and stone extraction     Given the size, distal location of stone, and clinical presentation, it was explained that he has a 95% chance of naturally passing the stone. He opted to attempt natural passage with pain control. Advised to continue Flomax daily and increase oral fluid intake to 2-3 Liters daily. If he should develop a fever, chills, or elevated WBC surgical management will be considered. We will plan on discharging today. He will be sent home with oral Flomax, pain medication, and encouraged to drink plenty of fluids. He was advised if he should develop fever, chills, uncontrolled pain, or intractable n/v, he should return to the ER. He voiced understanding.     Plan of care discussed with patient, wife, and nursing staff.         Jonathan Degroot, APRN  05/02/19 8:39 AM  Urology

## 2020-02-12 ENCOUNTER — HOSPITAL ENCOUNTER (OUTPATIENT)
Age: 40
Discharge: HOME | End: 2020-02-12

## 2020-02-12 ENCOUNTER — HOSPITAL ENCOUNTER (OUTPATIENT)
Dept: GENERAL RADIOLOGY | Age: 40
Discharge: HOME | End: 2020-02-12

## 2020-02-12 DIAGNOSIS — Z00.00 PHYSICAL EXAM, ANNUAL: Primary | ICD-10-CM

## 2020-02-12 LAB
ALBUMIN SERPL-MCNC: 4.8 G/DL (ref 3.5–5.1)
ALP BLD-CCNC: 57 U/L (ref 38–126)
ALT SERPL-CCNC: 57 U/L (ref 11–66)
ANION GAP SERPL CALCULATED.3IONS-SCNC: 11 MEQ/L (ref 8–16)
AST SERPL-CCNC: 34 U/L (ref 5–40)
BACTERIA: ABNORMAL
BASOPHILS # BLD: 1.1 %
BASOPHILS ABSOLUTE: 0.1 THOU/MM3 (ref 0–0.1)
BILIRUB SERPL-MCNC: 0.5 MG/DL (ref 0.3–1.2)
BILIRUBIN URINE: NEGATIVE
BLOOD, URINE: NEGATIVE
BUN BLDV-MCNC: 20 MG/DL (ref 7–22)
CALCIUM SERPL-MCNC: 9.9 MG/DL (ref 8.5–10.5)
CASTS: ABNORMAL /LPF
CASTS: ABNORMAL /LPF
CHARACTER, URINE: CLEAR
CHLORIDE BLD-SCNC: 100 MEQ/L (ref 98–111)
CHOLESTEROL, TOTAL: 199 MG/DL (ref 100–199)
CO2: 27 MEQ/L (ref 23–33)
COLOR: YELLOW
CREAT SERPL-MCNC: 0.8 MG/DL (ref 0.4–1.2)
CRYSTALS: ABNORMAL
EOSINOPHIL # BLD: 1.5 %
EOSINOPHILS ABSOLUTE: 0.1 THOU/MM3 (ref 0–0.4)
EPITHELIAL CELLS, UA: ABNORMAL /HPF
ERYTHROCYTE [DISTWIDTH] IN BLOOD BY AUTOMATED COUNT: 12.5 % (ref 11.5–14.5)
ERYTHROCYTE [DISTWIDTH] IN BLOOD BY AUTOMATED COUNT: 43.1 FL (ref 35–45)
GFR SERPL CREATININE-BSD FRML MDRD: > 90 ML/MIN/1.73M2
GLUCOSE BLD-MCNC: 79 MG/DL (ref 70–108)
GLUCOSE, URINE: NEGATIVE MG/DL
HCT VFR BLD CALC: 51.4 % (ref 42–52)
HDLC SERPL-MCNC: 42 MG/DL
HEMOGLOBIN: 16.9 GM/DL (ref 14–18)
IMMATURE GRANS (ABS): 0.01 THOU/MM3 (ref 0–0.07)
IMMATURE GRANULOCYTES: 0.2 %
KETONES, URINE: NEGATIVE
LDL CHOLESTEROL CALCULATED: 139 MG/DL
LEUKOCYTE ESTERASE, URINE: ABNORMAL
LYMPHOCYTES # BLD: 37.3 %
LYMPHOCYTES ABSOLUTE: 1.8 THOU/MM3 (ref 1–4.8)
MCH RBC QN AUTO: 30.8 PG (ref 26–33)
MCHC RBC AUTO-ENTMCNC: 32.9 GM/DL (ref 32.2–35.5)
MCV RBC AUTO: 93.8 FL (ref 80–94)
MISCELLANEOUS LAB TEST RESULT: ABNORMAL
MONOCYTES # BLD: 8.6 %
MONOCYTES ABSOLUTE: 0.4 THOU/MM3 (ref 0.4–1.3)
NITRITE, URINE: NEGATIVE
NUCLEATED RED BLOOD CELLS: 0 /100 WBC
PH UA: 5.5 (ref 5–9)
PLATELET # BLD: 229 THOU/MM3 (ref 130–400)
PMV BLD AUTO: 10.2 FL (ref 9.4–12.4)
POTASSIUM SERPL-SCNC: 4.4 MEQ/L (ref 3.5–5.2)
PROSTATE SPECIFIC ANTIGEN: 0.69 NG/ML (ref 0–1)
PROTEIN UA: NEGATIVE MG/DL
RBC # BLD: 5.48 MILL/MM3 (ref 4.7–6.1)
RBC URINE: ABNORMAL /HPF
RENAL EPITHELIAL, UA: ABNORMAL
SEG NEUTROPHILS: 51.3 %
SEGMENTED NEUTROPHILS ABSOLUTE COUNT: 2.4 THOU/MM3 (ref 1.8–7.7)
SODIUM BLD-SCNC: 138 MEQ/L (ref 135–145)
SPECIFIC GRAVITY UA: 1.02 (ref 1–1.03)
TOTAL PROTEIN: 8.2 G/DL (ref 6.1–8)
TRIGL SERPL-MCNC: 92 MG/DL (ref 0–199)
UROBILINOGEN, URINE: 0.2 EU/DL (ref 0–1)
WBC # BLD: 4.7 THOU/MM3 (ref 4.8–10.8)
WBC UA: ABNORMAL /HPF
YEAST: ABNORMAL

## 2021-11-08 ENCOUNTER — HOSPITAL ENCOUNTER (EMERGENCY)
Age: 41
Discharge: HOME | End: 2021-11-08
Payer: COMMERCIAL

## 2021-11-08 ENCOUNTER — APPOINTMENT (OUTPATIENT)
Dept: GENERAL RADIOLOGY | Age: 41
End: 2021-11-08
Payer: COMMERCIAL

## 2021-11-08 VITALS
WEIGHT: 220 LBS | DIASTOLIC BLOOD PRESSURE: 92 MMHG | RESPIRATION RATE: 16 BRPM | BODY MASS INDEX: 29.16 KG/M2 | TEMPERATURE: 97.5 F | HEART RATE: 71 BPM | SYSTOLIC BLOOD PRESSURE: 137 MMHG | HEIGHT: 73 IN | OXYGEN SATURATION: 97 %

## 2021-11-08 DIAGNOSIS — R07.9 CHEST PAIN, UNSPECIFIED TYPE: Primary | ICD-10-CM

## 2021-11-08 DIAGNOSIS — R06.02 SOB (SHORTNESS OF BREATH) ON EXERTION: ICD-10-CM

## 2021-11-08 DIAGNOSIS — M79.662 BILATERAL CALF PAIN: ICD-10-CM

## 2021-11-08 DIAGNOSIS — M79.661 BILATERAL CALF PAIN: ICD-10-CM

## 2021-11-08 LAB
ANION GAP SERPL CALCULATED.3IONS-SCNC: 11 MEQ/L (ref 8–16)
BASOPHILS # BLD: 0.8 %
BASOPHILS ABSOLUTE: 0.1 THOU/MM3 (ref 0–0.1)
BUN BLDV-MCNC: 18 MG/DL (ref 7–22)
CALCIUM SERPL-MCNC: 10.1 MG/DL (ref 8.5–10.5)
CHLORIDE BLD-SCNC: 100 MEQ/L (ref 98–111)
CO2: 26 MEQ/L (ref 23–33)
CREAT SERPL-MCNC: 0.8 MG/DL (ref 0.4–1.2)
EOSINOPHIL # BLD: 1.1 %
EOSINOPHILS ABSOLUTE: 0.1 THOU/MM3 (ref 0–0.4)
ERYTHROCYTE [DISTWIDTH] IN BLOOD BY AUTOMATED COUNT: 12.2 % (ref 11.5–14.5)
ERYTHROCYTE [DISTWIDTH] IN BLOOD BY AUTOMATED COUNT: 40.1 FL (ref 35–45)
GFR SERPL CREATININE-BSD FRML MDRD: > 90 ML/MIN/1.73M2
GLUCOSE BLD-MCNC: 107 MG/DL (ref 70–108)
HCT VFR BLD CALC: 52.2 % (ref 42–52)
HEMOGLOBIN: 17.9 GM/DL (ref 14–18)
IMMATURE GRANS (ABS): 0.01 THOU/MM3 (ref 0–0.07)
IMMATURE GRANULOCYTES: 0.2 %
LYMPHOCYTES # BLD: 36 %
LYMPHOCYTES ABSOLUTE: 2.3 THOU/MM3 (ref 1–4.8)
MCH RBC QN AUTO: 30.9 PG (ref 26–33)
MCHC RBC AUTO-ENTMCNC: 34.3 GM/DL (ref 32.2–35.5)
MCV RBC AUTO: 90 FL (ref 80–94)
MONOCYTES # BLD: 9.5 %
MONOCYTES ABSOLUTE: 0.6 THOU/MM3 (ref 0.4–1.3)
NUCLEATED RED BLOOD CELLS: 0 /100 WBC
OSMOLALITY CALCULATION: 276.2 MOSMOL/KG (ref 275–300)
PLATELET # BLD: 234 THOU/MM3 (ref 130–400)
PMV BLD AUTO: 9.6 FL (ref 9.4–12.4)
POTASSIUM REFLEX MAGNESIUM: 4 MEQ/L (ref 3.5–5.2)
PRO-BNP: < 5 PG/ML (ref 0–450)
RBC # BLD: 5.8 MILL/MM3 (ref 4.7–6.1)
SEG NEUTROPHILS: 52.4 %
SEGMENTED NEUTROPHILS ABSOLUTE COUNT: 3.4 THOU/MM3 (ref 1.8–7.7)
SODIUM BLD-SCNC: 137 MEQ/L (ref 135–145)
TROPONIN T: < 0.01 NG/ML
TROPONIN T: < 0.01 NG/ML
TSH SERPL DL<=0.05 MIU/L-ACNC: 3.13 UIU/ML (ref 0.4–4.2)
WBC # BLD: 6.4 THOU/MM3 (ref 4.8–10.8)

## 2021-11-08 PROCEDURE — 83880 ASSAY OF NATRIURETIC PEPTIDE: CPT

## 2021-11-08 PROCEDURE — 84443 ASSAY THYROID STIM HORMONE: CPT

## 2021-11-08 PROCEDURE — 36415 COLL VENOUS BLD VENIPUNCTURE: CPT

## 2021-11-08 PROCEDURE — 84484 ASSAY OF TROPONIN QUANT: CPT

## 2021-11-08 PROCEDURE — 85025 COMPLETE CBC W/AUTO DIFF WBC: CPT

## 2021-11-08 PROCEDURE — 6370000000 HC RX 637 (ALT 250 FOR IP): Performed by: PHYSICIAN ASSISTANT

## 2021-11-08 PROCEDURE — 99284 EMERGENCY DEPT VISIT MOD MDM: CPT

## 2021-11-08 PROCEDURE — 93005 ELECTROCARDIOGRAM TRACING: CPT | Performed by: PHYSICIAN ASSISTANT

## 2021-11-08 PROCEDURE — 80048 BASIC METABOLIC PNL TOTAL CA: CPT

## 2021-11-08 PROCEDURE — 71045 X-RAY EXAM CHEST 1 VIEW: CPT

## 2021-11-08 RX ORDER — NITROGLYCERIN 0.3 MG/1
TABLET SUBLINGUAL
Qty: 30 TABLET | Refills: 3 | Status: SHIPPED | OUTPATIENT
Start: 2021-11-08 | End: 2022-02-09

## 2021-11-08 RX ORDER — ASPIRIN 81 MG/1
324 TABLET, CHEWABLE ORAL ONCE
Status: COMPLETED | OUTPATIENT
Start: 2021-11-08 | End: 2021-11-08

## 2021-11-08 RX ADMIN — NITROGLYCERIN 0.5 INCH: 20 OINTMENT TOPICAL at 07:08

## 2021-11-08 RX ADMIN — ASPIRIN 81 MG CHEWABLE TABLET 324 MG: 81 TABLET CHEWABLE at 07:08

## 2021-11-08 ASSESSMENT — ENCOUNTER SYMPTOMS
BACK PAIN: 0
NAUSEA: 1
SHORTNESS OF BREATH: 1

## 2021-11-08 NOTE — ED NOTES
Patient resting on cot at this time respirations easy and unlabored. No needs voiced.       Miranda Guadalupe RN  11/08/21 [id number]

## 2021-11-08 NOTE — Clinical Note
Priya Arango was seen and treated in our emergency department on 11/8/2021. He may return to work on 11/11/2021. If you have any questions or concerns, please don't hesitate to call.       Mateo Kessler PA-C

## 2021-11-08 NOTE — ED TRIAGE NOTES
Pt presents to the ED with c/o chest pain. Pt states last night his heart rate was 115. Pt states his wife called EMS but he declined being seen. Pt states this morning, he was getting ready to go to work when he started having chest tightness and dizziness. Pt states about 2 years ago he had something similar, but it went away.

## 2021-11-08 NOTE — ED PROVIDER NOTES
[facility]  eMERGENCY dEPARTMENT eNCOUnter          CHIEF COMPLAINT       Chief Complaint   Patient presents with    Chest Pain       Nurses Notes reviewed and I agree except as noted inthe HPI. HISTORY OF PRESENT ILLNESS    Lili Seay is a 35 y.o. male who presents to the Emergency Department for the evaluation of chest pain. Patient states that yesterday he helped flip a mattress at home. States that afterward he felt dizzy and nauseous and after sitting down, had sensation of heart racing. Wife called EMS and he was noted to have heart rate of 115. He states he had associated fatigue which did improve somewhat throughout the evening as his heart rate improved and he declined evaluation. He reports mild persistent fatigue but states this morning around 520 or 530 while driving to work he developed some chest heaviness and tightness associated with lightheadedness. He states is been persistent since onset without change and he has not tried anything for treatment. No history of similar symptoms. He denies any prior cardiac history. No history of hypertension, hyperlipidemia, diabetes or smoking but he does report family history of CABG in multiple family members including grandmother, grandfather and uncle. Patient has never had any prior cardiac evaluation does not follow with cardiology. Reports only medication use is vitamin supplements due to history of celiac disease for which she is on no other treatment. Denies any recent illness. He does report that for the past 3 to 4 months he has noticed shortness of breath on exertion. States that while climbing 2 flights of stairs he will get short of breath which is not typical for him. He is also noted bilateral calf pain that occurs with ambulation and improves with rest.  He reports feeling chilled easier and fatigued easier during this period of time as well. Denies any unintentional weight changes.   Denies any persistent cough, orthopnea, bilateral lower extremity pain or swelling aside from the brief pain with exertion. He also notes chronic snoring with paroxysmal nocturnal dyspnea. He has never had sleep study in the past.      The HPI was provided by the patient. REVIEW OF SYSTEMS     Review of Systems   Constitutional: Positive for fatigue. Respiratory: Positive for shortness of breath. Cardiovascular: Positive for chest pain and palpitations. Negative for leg swelling. Gastrointestinal: Positive for nausea. Musculoskeletal: Negative for back pain. Neurological: Positive for light-headedness. Negative for dizziness and syncope. All other systems reviewed and are negative. PAST MEDICAL HISTORY    has no past medical history on file. SURGICAL HISTORY      has a past surgical history that includes other surgical history (11/26/13); Vasectomy (2016); Cholecystectomy; Urethra surgery; and Endoscopy, colon, diagnostic (2016). CURRENT MEDICATIONS       Discharge Medication List as of 11/8/2021 10:42 AM      CONTINUE these medications which have NOT CHANGED    Details   tamsulosin (FLOMAX) 0.4 MG capsule Take 1 capsule by mouth daily for 10 doses, Disp-10 capsule, R-0Normal      ketorolac (TORADOL) 10 MG tablet Take 1 tablet by mouth every 6 hours as needed for Pain, Disp-20 tablet, R-0Normal      ondansetron (ZOFRAN ODT) 4 MG disintegrating tablet Take 1 tablet by mouth every 8 hours as needed for Nausea, Disp-20 tablet, R-0Print      vitamin D (CHOLECALCIFEROL) 1000 UNIT TABS tablet Take by mouth daily Indications: 5000 untis dailyHistorical Med             ALLERGIES     is allergic to wheat bran. FAMILY HISTORY     He indicated that his mother is alive. He indicated that his father is alive. He indicated that the status of his sister is unknown. He indicated that the status of his maternal grandfather is unknown. He indicated that the status of his paternal grandmother is unknown.  He indicated that the status of his daughter is unknown. He indicated that the status of his paternal uncle is unknown. He indicated that the status of his neg hx is unknown.   family history includes Alcohol Abuse in his paternal grandmother; Arthritis in his father, mother, and sister; Coronary Art Dis in his paternal grandmother; Diabetes in his paternal grandmother and paternal uncle; Hearing Loss in his mother; Heart Disease in his paternal grandmother; High Blood Pressure in his father and mother; Learning Disabilities in his daughter; Hildegard Clore / Lodema Render in his mother and sister; Stroke in his maternal grandfather. SOCIAL HISTORY      reports that he has never smoked. He has quit using smokeless tobacco.  His smokeless tobacco use included chew. He reports that he does not drink alcohol and does not use drugs. PHYSICAL EXAM     INITIAL VITALS:  height is 6' 1\" (1.854 m) and weight is 220 lb (99.8 kg). His oral temperature is 97.5 °F (36.4 °C). His blood pressure is 137/92 (abnormal) and his pulse is 71. His respiration is 16 and oxygen saturation is 97%. Physical Exam  Vitals and nursing note reviewed. Constitutional:       Appearance: Normal appearance. HENT:      Head: Normocephalic and atraumatic. Eyes:      Conjunctiva/sclera: Conjunctivae normal.   Cardiovascular:      Rate and Rhythm: Normal rate and regular rhythm. Pulses: Normal pulses. Heart sounds: Normal heart sounds. No murmur heard. Pulmonary:      Effort: Pulmonary effort is normal. No respiratory distress. Breath sounds: No wheezing or rhonchi. Abdominal:      Palpations: Abdomen is soft. Tenderness: There is no abdominal tenderness. There is no guarding or rebound. Musculoskeletal:         General: No tenderness. Cervical back: Normal range of motion. Right lower leg: No edema. Left lower leg: No edema. Skin:     General: Skin is warm and dry.    Neurological:      General: No focal deficit present. Mental Status: He is alert and oriented to person, place, and time. Psychiatric:         Mood and Affect: Mood normal.         DIFFERENTIAL DIAGNOSIS:   Differential diagnoses are discussed    DIAGNOSTIC RESULTS     EKG: All EKG's are interpreted by the Emergency Department Physician who either signs or Co-signsthis chart in the absence of a cardiologist.    Vent. Rate: 84 bpm  PRinterval: 168 ms  QRS duration: 98 ms  QTc: 425 ms  P-R-T axes: 65, 5, 31  Sinus rhythm with occasional PVCs. No STEMI  Compared to old EKG on 2-12-20      RADIOLOGY: non-plain film images(s) such as CT, Ultrasound and MRI are read by the radiologist.    XR CHEST PORTABLE   Final Result   Impression:   1. No acute cardiopulmonary disease. This document has been electronically signed by: Ji Silva MD on    11/08/2021 07:17 AM          LABS:      Labs Reviewed   CBC WITH AUTO DIFFERENTIAL - Abnormal; Notable for the following components:       Result Value    Hematocrit 52.2 (*)     All other components within normal limits   BASIC METABOLIC PANEL W/ REFLEX TO MG FOR LOW K   TROPONIN   TSH WITH REFLEX   BRAIN NATRIURETIC PEPTIDE   ANION GAP   GLOMERULAR FILTRATION RATE, ESTIMATED   OSMOLALITY   TROPONIN       EMERGENCY DEPARTMENT COURSE:   Vitals:    Vitals:    11/08/21 0627 11/08/21 0735 11/08/21 0914 11/08/21 1012   BP: (!) 145/95 (!) 139/93 131/89 (!) 137/92   Pulse: 73 64 64 71   Resp: 18 12 17 16   Temp: 97.5 °F (36.4 °C)      TempSrc: Oral      SpO2: 100% 99% 99% 97%   Weight: 220 lb (99.8 kg)      Height: 6' 1\" (1.854 m)         7:19 AM EST: The patient was seen and evaluated. Patient presents with mild hypertension and otherwise reassuring vitals for complaint of chest pain. He has no prior known cardiac history but does have family history of multiple family members requiring CABG.   Patient has never had any cardiac work-up in the past.  He has no history of hypertension, hyperlipidemia, diabetes or smoking but does have symptoms consistent with JOCY and again, family history. He complains of chest heaviness and lightheadedness that began this morning which resolved entirely after application of nitroglycerin paste in the emergency department and aspirin without recurrence. Initial EKG showed occasional PVCs and follow-up EKG is normal with resolution of the PVCs. Laboratory results including serial troponin is reassuring and chest x-ray is unremarkable. As the patient's symptoms have entirely resolved, he appears stable for discharge and outpatient follow-up at this time. Return precautions were discussed. He is scheduled for follow-up in 2 days with the cardiology clinic and should follow-up with his PCP if his symptoms of JOCY/claudication are unable to be addressed as this is a chest pain clinic appointment. He should seek further evaluation in the ED should he have chest pain that persist despite nitroglycerin for progressive worsening of symptoms and he verbalized agreement prior to discharge. CRITICAL CARE:   None    CONSULTS:  None    PROCEDURES:  None    FINAL IMPRESSION      1. Chest pain, unspecified type    2. SOB (shortness of breath) on exertion    3. Bilateral calf pain          DISPOSITION/PLAN   Discharge    PATIENT REFERRED TO:  [facility]  In 2 days      Milagros Obando, [address] [facility] [address]LEJ[address] L[facility] [address]  [phone]    Call in 2 days  if leg pain with activity is not able to be addressed by cardiology    [facility] EMERGENCY DEPT  [address] [facility] [address] [phone]    If symptoms worsen      DISCHARGEMEDICATIONS:  Discharge Medication List as of 11/8/2021 10:42 AM      START taking these medications    Details   nitroGLYCERIN (NITROSTAT) 0.3 MG SL tablet up to max of 3 total doses.  If no relief after 1 dose, call 911., Disp-30 tablet, R-3Normal             (Please note that portions of this note were completedwith a voice recognition program.  Efforts were made to edit the dictations but occasionally words are mis-transcribed.)       Michelle Trevizo PA-C  11/09/21 [id number]

## 2021-11-09 LAB
EKG ATRIAL RATE: 70 BPM
EKG ATRIAL RATE: 86 BPM
EKG P AXIS: 44 DEGREES
EKG P AXIS: 65 DEGREES
EKG P-R INTERVAL: 168 MS
EKG P-R INTERVAL: 180 MS
EKG Q-T INTERVAL: 360 MS
EKG Q-T INTERVAL: 370 MS
EKG QRS DURATION: 96 MS
EKG QRS DURATION: 98 MS
EKG QTC CALCULATION (BAZETT): 399 MS
EKG QTC CALCULATION (BAZETT): 425 MS
EKG R AXIS: 0 DEGREES
EKG R AXIS: 5 DEGREES
EKG T AXIS: 14 DEGREES
EKG T AXIS: 31 DEGREES
EKG VENTRICULAR RATE: 70 BPM
EKG VENTRICULAR RATE: 84 BPM

## 2021-11-09 PROCEDURE — 93010 ELECTROCARDIOGRAM REPORT: CPT | Performed by: INTERNAL MEDICINE

## 2021-11-10 ENCOUNTER — OFFICE VISIT (OUTPATIENT)
Dept: CARDIOLOGY CLINIC | Age: 41
End: 2021-11-10
Payer: COMMERCIAL

## 2021-11-10 VITALS
HEIGHT: 73 IN | SYSTOLIC BLOOD PRESSURE: 138 MMHG | DIASTOLIC BLOOD PRESSURE: 82 MMHG | BODY MASS INDEX: 29.69 KG/M2 | WEIGHT: 224 LBS | HEART RATE: 72 BPM

## 2021-11-10 DIAGNOSIS — R07.2 PRECORDIAL PAIN: Primary | ICD-10-CM

## 2021-11-10 PROCEDURE — 99204 OFFICE O/P NEW MOD 45 MIN: CPT | Performed by: NUCLEAR MEDICINE

## 2021-11-10 ASSESSMENT — ENCOUNTER SYMPTOMS
PHOTOPHOBIA: 0
ABDOMINAL DISTENTION: 0
ABDOMINAL PAIN: 0
RECTAL PAIN: 0
BLOOD IN STOOL: 0
NAUSEA: 0
VOMITING: 0
COLOR CHANGE: 0
DIARRHEA: 0
SHORTNESS OF BREATH: 0
CONSTIPATION: 0
CHEST TIGHTNESS: 0
ANAL BLEEDING: 0
BACK PAIN: 0

## 2021-11-10 NOTE — LETTER
[address] [facility]  [place] [address]  [place] [address]  Phone: [phone]  Fax: [phone]    Aaliyah Bernardo MD        November 10, 2021     Patient: Kevin Lipscomb   YOB: 1980   Date of Visit: 11/10/2021       To Whom It May Concern: It is my medical opinion that Elfredia Habermann should remain off work until 11/15/2021    If you have any questions or concerns, please don't hesitate to call.     Sincerely,        Aaliyah Bernardo MD

## 2021-11-10 NOTE — PROGRESS NOTES
[address] [facility] [place] [address]  [place] [address]  Dept: [phone]  Dept Fax: [phone]  Loc: [phone]    Visit Date: 11/10/2021    Shanita Conteh is a 35 y.o. 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 No pertinent past medical history.    Past Surgical History:   Procedure Laterality Date    CHOLECYSTECTOMY      ENDOSCOPY, COLON, DIAGNOSTIC  2016    Dr Shahzad Guerra HISTORY  11/26/13    [facility] Internal Urethrotomy - Dr. Susana Mirza URETHRA SURGERY      tear    VASECTOMY  2016     Family History   Problem Relation Age of Onset    High Blood Pressure Mother     Arthritis Mother     Hearing Loss Mother     Miscarriages / [place] Mother     High Blood Pressure Father     Arthritis Father     Diabetes Paternal Uncle     Diabetes Paternal Grandmother     Alcohol Abuse Paternal Grandmother     Heart Disease Paternal Grandmother     Coronary Art Dis Paternal Grandmother     Arthritis Sister    [de-identified] / [place] Sister     Stroke Maternal Grandfather     Learning Disabilities Daughter     Allergy (Severe) Neg Hx     Anemia Neg Hx     Arrhythmia Neg Hx     Atrial Fibrillation Neg Hx     Asthma Neg Hx     Birth Defects Neg Hx     Breast Cancer Neg Hx     Colon Cancer Neg Hx     Prostate Cancer Neg Hx     Cancer Neg Hx     Depression Neg Hx     Early Death Neg Hx     Heart Attack Neg Hx     Kidney Disease Neg Hx     Mental Illness Neg Hx     Mental Retardation Neg Hx     Obesity Neg Hx     Osteoporosis Neg Hx     Substance Abuse Neg Hx     Vision Loss Neg Hx      Social History     Tobacco Use    Smoking status: Never Smoker    Smokeless tobacco: Former User     Types: Chew   Substance Use Topics    Alcohol use: No      Current Outpatient Medications   Medication Sig Dispense Refill    vitamin D (CHOLECALCIFEROL) 1000 UNIT TABS tablet Take by mouth daily Indications: 5000 untis daily      nitroGLYCERIN (NITROSTAT) 0.3 MG SL tablet up to max of 3 total doses. If no relief after 1 dose, call 911. (Patient not taking: Reported on 11/10/2021) 30 tablet 3    ketorolac (TORADOL) 10 MG tablet Take 1 tablet by mouth every 6 hours as needed for Pain (Patient not taking: Reported on 11/10/2021) 20 tablet 0     No current facility-administered medications for this visit. Allergies   Allergen Reactions    Wheat Bran Diarrhea     Health Maintenance   Topic Date Due    Hepatitis C screen  Never done    COVID-19 Vaccine (1) Never done    HIV screen  Never done    DTaP/Tdap/Td vaccine (1 - Tdap) Never done    Diabetes screen  Never done    Flu vaccine (1) Never done    Lipid screen  02/12/2025    Hepatitis A vaccine  Aged Out    Hepatitis B vaccine  Aged Out    Hib vaccine  Aged Out    Meningococcal (ACWY) vaccine  Aged Out    Pneumococcal 0-64 years Vaccine  Aged Out       Subjective:  Review of Systems   Constitutional: Positive for fatigue. HENT: Negative for ear pain and mouth sores. Eyes: Negative for photophobia. Respiratory: Negative for chest tightness and shortness of breath. Cardiovascular: Positive for palpitations. Negative for chest pain and leg swelling.    Gastrointestinal: Negative for abdominal distention, abdominal pain, anal bleeding, blood in stool, constipation, diarrhea, nausea, rectal pain and vomiting. Endocrine: Negative for polyphagia. Genitourinary: Negative for dysuria, frequency, hematuria and urgency. Musculoskeletal: Negative for arthralgias, back pain, gait problem, joint swelling, myalgias, neck pain and neck stiffness. Skin: Negative for color change, pallor and rash. Allergic/Immunologic: Negative for food allergies. Neurological: Negative for dizziness, syncope and light-headedness. Psychiatric/Behavioral: Negative for confusion, decreased concentration, dysphoric mood, hallucinations and self-injury. The patient is not nervous/anxious and is not hyperactive. Objective:  Physical Exam  Constitutional:       Appearance: He is normal weight. HENT:      Head: Normocephalic. Right Ear: Tympanic membrane normal.      Nose: Nose normal.      Mouth/Throat:      Mouth: Mucous membranes are moist.   Eyes:      Pupils: Pupils are equal, round, and reactive to light. Cardiovascular:      Rate and Rhythm: Normal rate and regular rhythm. Heart sounds: Murmur heard. Pulmonary:      Effort: No respiratory distress. Breath sounds: No stridor. No wheezing, rhonchi or rales. Chest:      Chest wall: No tenderness. Abdominal:      General: There is no distension. Palpations: There is no mass. Tenderness: There is no abdominal tenderness. There is no right CVA tenderness, left CVA tenderness, guarding or rebound. Hernia: No hernia is present. Musculoskeletal:         General: No swelling, tenderness, deformity or signs of injury. Cervical back: Normal range of motion. Right lower leg: No edema. Left lower leg: No edema. Skin:     Coloration: Skin is not jaundiced or pale. Findings: No bruising, erythema, lesion or rash. Neurological:      Mental Status: He is oriented to person, place, and time. Cranial Nerves: No cranial nerve deficit. Sensory: No sensory deficit. Motor: No weakness.       Coordination: Coordination normal.      Gait: Gait normal.      Deep Tendon Reflexes: Reflexes normal.   Psychiatric:         Mood and Affect: Mood normal.         Thought Content: Thought content normal.       /82   Pulse 72   Ht 6' 1\" (1.854 m)   Wt 224 lb (101.6 kg)   BMI 29.55 kg/m²     Assessment:      Diagnosis Orders   1. Precordial pain     possible arrhythmia   No known CAD before  PVCs on the ECG   Moderate to low risk for CAD  ? ? Angina   Plan:  No follow-ups on file. As above  Event monitor   Echo   Continue risk factor modification and medical management  Thank you for allowing me to participate in the care of your patient. Please don't hesitate to contact me regarding any further issues related to the patient care    Orders Placed:  No orders of the defined types were placed in this encounter. Medications Prescribed:  No orders of the defined types were placed in this encounter. Discussed use, benefit, and side effects of prescribed medications. All patient questions answered. Pt voicedunderstanding. Instructed to continue current medications, diet and exercise. Continue risk factor modification and medical management. Patient agreed with treatment plan. Follow up as directed.     Electronically signedby Luis Alfredo Olson MD on 11/10/2021 at 9:32 AM

## 2021-11-16 ENCOUNTER — HOSPITAL ENCOUNTER (OUTPATIENT)
Dept: NON INVASIVE DIAGNOSTICS | Age: 41
Discharge: HOME | End: 2021-11-16
Payer: COMMERCIAL

## 2021-11-16 DIAGNOSIS — R07.2 PRECORDIAL PAIN: ICD-10-CM

## 2021-11-16 LAB
LV EF: 60 %
LVEF MODALITY: NORMAL

## 2021-11-16 PROCEDURE — 93306 TTE W/DOPPLER COMPLETE: CPT

## 2021-11-16 PROCEDURE — 93270 REMOTE 30 DAY ECG REV/REPORT: CPT

## 2021-12-28 NOTE — PROCEDURES
[address] [place] [address]                                 EVENT MONITOR    PATIENT NAME: Hung Martinez                   :        1980  MED REC NO:   [id number]                           ROOM:  ACCOUNT NO:   [de-identified]                           ADMIT DATE: 2021  PROVIDER:     Isatu Flores M.D.    Tamiko Donath:  2021 to 12/15/2021. INDICATION:  Chest pain. FINDINGS:  The patient is in normal sinus rhythm with no atrial  fibrillation. Captured heart rate ranging from 46 to 152 beats per  minute. There were two autotriggered captures and there were around eight  symptom, patient triggered captures. Patient triggered captures are  symptomatology reported skipped beats, fatigue, chest pain, tiredness,  fatigue, and all of them basically are associated with isolated  ventricular ectopic beat but one, as well as artifactual, and otherwise,  patient triggered, autotriggered captures are characterized with sinus  bradycardia, rate of 55 beats per minute. No other form of arrhythmia  noted. No pauses. No AV block. No ventricular or supraventricular  tachycardia. No atrial fibrillation. No other form of arrhythmia. CONCLUSION:  This is a normal sinus rhythm with captured heart rate  ranging from 46 to 152 beats per minute. There is no atrial  fibrillation. No ventricular or supraventricular tachycardia. No  pauses. No AV block. Autotriggered capture associated with sinus bradycardia of 55 beats per  minute. No other form of arrhythmia noted. _____ patient-triggered capture associated with predominantly isolated  ventricular ectopic beat.     There was occasional isolated ventricular ectopic that has been noted,  that has been associated with skipped beat, fatigue, and so, basically  the symptom of the patient correlates with isolated ventricular ectopic  beat, but all the patient-triggered captures are associated with normal  heart rate and normal sinus rhythm. Willem Goodrich.  Jordan Rodriguez M.D.    D: 12/27/2021 18:10:29       T: 12/27/2021 18:12:58     [email or web address]  Job#: [id number]     Doc#: [id number]    CC:

## 2022-01-03 ENCOUNTER — TELEPHONE (OUTPATIENT)
Dept: CARDIOLOGY CLINIC | Age: 42
End: 2022-01-03

## 2022-02-09 ENCOUNTER — OFFICE VISIT (OUTPATIENT)
Dept: CARDIOLOGY CLINIC | Age: 42
End: 2022-02-09
Payer: COMMERCIAL

## 2022-02-09 VITALS
BODY MASS INDEX: 30.55 KG/M2 | WEIGHT: 230.5 LBS | DIASTOLIC BLOOD PRESSURE: 78 MMHG | HEART RATE: 68 BPM | HEIGHT: 73 IN | SYSTOLIC BLOOD PRESSURE: 116 MMHG

## 2022-02-09 DIAGNOSIS — R00.2 PALPITATION: Primary | ICD-10-CM

## 2022-02-09 PROCEDURE — 99213 OFFICE O/P EST LOW 20 MIN: CPT | Performed by: NUCLEAR MEDICINE

## 2022-02-09 NOTE — PROGRESS NOTES
3 month follow-up. He states he has felt a lot better. He does have intermittent palpitations with exertion at work. He states he takes Teramix which is an herb for inflammation.

## 2022-02-09 NOTE — PROGRESS NOTES
[id number] [facility] [address]  [address]  [facility] [address]  Dept: [phone]  Dept Fax: [phone]  Loc: [phone]    Visit Date: 2/9/2022    Kd Briggs is a 35 y.o. male who presents todayfor:  Chief Complaint   Patient presents with    3 Month Follow-Up    Palpitations     Palpitation is much better  Event monitor was okay   Echo was okay   No chest pain   No changes in breathing  No known CAD  Bp is stable   No dizziness  No syncope      HPI:  HPI  History reviewed. No pertinent past medical history.    Past Surgical History:   Procedure Laterality Date    CHOLECYSTECTOMY      ENDOSCOPY, COLON, DIAGNOSTIC  2016    Dr Hannah Calderon OTHER SURGICAL HISTORY  11/26/13    [facility] Internal Urethrotomy - Dr. Alphonso Mena URETHRA SURGERY      tear    VASECTOMY  2016     Family History   Problem Relation Age of Onset    High Blood Pressure Mother     Arthritis Mother     Hearing Loss Mother     Miscarriages / [place] Mother     High Blood Pressure Father     Arthritis Father     Diabetes Paternal Uncle     Diabetes Paternal Grandmother     Alcohol Abuse Paternal Grandmother     Heart Disease Paternal Grandmother     Coronary Art Dis Paternal Grandmother     Arthritis Sister     Miscarriages / [place] Sister     Stroke Maternal Grandfather     Learning Disabilities Daughter     Allergy (Severe) Neg Hx     Anemia Neg Hx     Arrhythmia Neg Hx     Atrial Fibrillation Neg Hx     Asthma Neg Hx     Birth Defects Neg Hx     Breast Cancer Neg Hx     Colon Cancer Neg Hx     Prostate Cancer Neg Hx     Cancer Neg Hx     Depression Neg Hx     Early Death Neg Hx     Heart Attack Neg Hx     Kidney Disease Neg Hx     Mental Illness Neg Hx     Mental Retardation Neg Hx     Obesity Neg Hx     Osteoporosis Neg Hx     Substance Abuse Neg Hx     Vision Loss Neg Hx      Social History     Tobacco Use    Smoking status: Never Smoker    Smokeless tobacco: Former User     Types: Chew   Substance Use Topics    Alcohol use: No      Current Outpatient Medications   Medication Sig Dispense Refill    Zinc Sulfate (ZINC 15 PO) Take by mouth      vitamin D (CHOLECALCIFEROL) 1000 UNIT TABS tablet Take by mouth daily Indications: 5000 untis daily       No current facility-administered medications for this visit. Allergies   Allergen Reactions    Wheat Bran Diarrhea     Health Maintenance   Topic Date Due    Hepatitis C screen  Never done    COVID-19 Vaccine (1) Never done    Depression Screen  Never done    HIV screen  Never done    DTaP/Tdap/Td vaccine (1 - Tdap) Never done    Diabetes screen  Never done    Flu vaccine (1) Never done    Lipid screen  02/12/2025    Hepatitis A vaccine  Aged Out    Hepatitis B vaccine  Aged Out    Hib vaccine  Aged Out    Meningococcal (ACWY) vaccine  Aged Out    Pneumococcal 0-64 years Vaccine  Aged Out       Subjective:  Review of Systems  General:   No fever, no chills, No fatigue or weight loss  Pulmonary:    No dyspnea, no wheezing  Cardiac:    Denies recent chest pain,   GI:     No nausea or vomiting, no abdominal pain  Neuro:    No dizziness or light headedness,   Musculoskeletal:  No recent active issues  Extremities:   No edema, no obvious claudication       Objective:  Physical Exam  /78   Pulse 68   Ht 6' 1\" (1.854 m)   Wt 230 lb 8 oz (104.6 kg)   BMI 30.41 kg/m²   General:   Well developed, well nourished  Lungs:   Clear to auscultation  Heart:    Normal S1 S2, Slight murmur. no rubs, no gallops  Abdomen:   Soft, non tender, no organomegalies, positive bowel sounds  Extremities:   No edema, no cyanosis, good peripheral pulses  Neurological:   Awake, alert, oriented. No obvious focal deficits  Musculoskelatal:  No obvious deformities    Assessment:      Diagnosis Orders   1. Palpitation     as above  Cardiac fair for now     Plan:  No follow-ups on file.   As above  Will see PRN   Continue risk factor modification and medical management  Thank you for allowing me to participate in the care of your patient. Please don't hesitate to contact me regarding any further issues related to the patient care    Orders Placed:  No orders of the defined types were placed in this encounter. Medications Prescribed:  No orders of the defined types were placed in this encounter. Discussed use, benefit, and side effects of prescribed medications. All patient questions answered. Pt voicedunderstanding. Instructed to continue current medications, diet and exercise. Continue risk factor modification and medical management. Patient agreed with treatment plan. Follow up as directed.     Electronically signedby Corrie Krishna MD on 2/9/2022 at 10:32 AM

## 2022-06-28 ENCOUNTER — OFFICE VISIT (OUTPATIENT)
Dept: FAMILY MEDICINE CLINIC | Age: 42
End: 2022-06-28
Payer: COMMERCIAL

## 2022-06-28 VITALS
DIASTOLIC BLOOD PRESSURE: 82 MMHG | WEIGHT: 223 LBS | SYSTOLIC BLOOD PRESSURE: 118 MMHG | RESPIRATION RATE: 16 BRPM | OXYGEN SATURATION: 95 % | HEIGHT: 72 IN | TEMPERATURE: 96.9 F | BODY MASS INDEX: 30.2 KG/M2 | HEART RATE: 65 BPM

## 2022-06-28 DIAGNOSIS — Z11.59 ENCOUNTER FOR HCV SCREENING TEST FOR LOW RISK PATIENT: ICD-10-CM

## 2022-06-28 DIAGNOSIS — Z13.31 DEPRESSION SCREENING: ICD-10-CM

## 2022-06-28 DIAGNOSIS — Z00.00 WELL ADULT EXAM: Primary | ICD-10-CM

## 2022-06-28 DIAGNOSIS — E66.09 CLASS 1 OBESITY DUE TO EXCESS CALORIES WITHOUT SERIOUS COMORBIDITY WITH BODY MASS INDEX (BMI) OF 30.0 TO 30.9 IN ADULT: ICD-10-CM

## 2022-06-28 DIAGNOSIS — Z12.5 SCREENING PSA (PROSTATE SPECIFIC ANTIGEN): ICD-10-CM

## 2022-06-28 DIAGNOSIS — Z11.4 SCREENING FOR HUMAN IMMUNODEFICIENCY VIRUS: ICD-10-CM

## 2022-06-28 DIAGNOSIS — K90.0 CELIAC DISEASE: ICD-10-CM

## 2022-06-28 PROBLEM — Z90.49 HISTORY OF CHOLECYSTECTOMY: Status: ACTIVE | Noted: 2022-06-28

## 2022-06-28 PROBLEM — N20.0 KIDNEY STONES: Status: RESOLVED | Noted: 2019-05-01 | Resolved: 2022-06-28

## 2022-06-28 PROBLEM — N20.0 KIDNEY STONE: Status: RESOLVED | Noted: 2019-05-01 | Resolved: 2022-06-28

## 2022-06-28 PROBLEM — E66.811 CLASS 1 OBESITY DUE TO EXCESS CALORIES WITHOUT SERIOUS COMORBIDITY WITH BODY MASS INDEX (BMI) OF 30.0 TO 30.9 IN ADULT: Status: ACTIVE | Noted: 2022-06-28

## 2022-06-28 PROCEDURE — G0444 DEPRESSION SCREEN ANNUAL: HCPCS | Performed by: STUDENT IN AN ORGANIZED HEALTH CARE EDUCATION/TRAINING PROGRAM

## 2022-06-28 PROCEDURE — 99386 PREV VISIT NEW AGE 40-64: CPT | Performed by: STUDENT IN AN ORGANIZED HEALTH CARE EDUCATION/TRAINING PROGRAM

## 2022-06-28 SDOH — ECONOMIC STABILITY: FOOD INSECURITY: WITHIN THE PAST 12 MONTHS, THE FOOD YOU BOUGHT JUST DIDN'T LAST AND YOU DIDN'T HAVE MONEY TO GET MORE.: NEVER TRUE

## 2022-06-28 SDOH — ECONOMIC STABILITY: TRANSPORTATION INSECURITY
IN THE PAST 12 MONTHS, HAS THE LACK OF TRANSPORTATION KEPT YOU FROM MEDICAL APPOINTMENTS OR FROM GETTING MEDICATIONS?: NO

## 2022-06-28 SDOH — ECONOMIC STABILITY: FOOD INSECURITY: WITHIN THE PAST 12 MONTHS, YOU WORRIED THAT YOUR FOOD WOULD RUN OUT BEFORE YOU GOT MONEY TO BUY MORE.: NEVER TRUE

## 2022-06-28 SDOH — ECONOMIC STABILITY: TRANSPORTATION INSECURITY
IN THE PAST 12 MONTHS, HAS LACK OF TRANSPORTATION KEPT YOU FROM MEETINGS, WORK, OR FROM GETTING THINGS NEEDED FOR DAILY LIVING?: NO

## 2022-06-28 ASSESSMENT — PATIENT HEALTH QUESTIONNAIRE - PHQ9
2. FEELING DOWN, DEPRESSED OR HOPELESS: 0
SUM OF ALL RESPONSES TO PHQ QUESTIONS 1-9: 0
SUM OF ALL RESPONSES TO PHQ QUESTIONS 1-9: 0
1. LITTLE INTEREST OR PLEASURE IN DOING THINGS: 0
SUM OF ALL RESPONSES TO PHQ QUESTIONS 1-9: 0
SUM OF ALL RESPONSES TO PHQ9 QUESTIONS 1 & 2: 0
SUM OF ALL RESPONSES TO PHQ QUESTIONS 1-9: 0

## 2022-06-28 ASSESSMENT — ENCOUNTER SYMPTOMS
EYE PAIN: 0
CONSTIPATION: 0
SHORTNESS OF BREATH: 0
ABDOMINAL PAIN: 0
NAUSEA: 0
RHINORRHEA: 0
EYE REDNESS: 0
COUGH: 0
VOMITING: 0
DIARRHEA: 0

## 2022-06-28 ASSESSMENT — SOCIAL DETERMINANTS OF HEALTH (SDOH): HOW HARD IS IT FOR YOU TO PAY FOR THE VERY BASICS LIKE FOOD, HOUSING, MEDICAL CARE, AND HEATING?: NOT HARD AT ALL

## 2022-06-28 NOTE — PROGRESS NOTES
Denilson Rodriguez (:  1980) is a 38 y.o. male,New patient, here for evaluation of the following chief complaint(s):  New Patient (est care ), Annual Exam (yearly ), and Health Maintenance (vaccines )         ASSESSMENT/PLAN:  1. Well adult exam  -     Comprehensive Metabolic Panel; Future  -     Lipid Panel; Future  49-year-old male presents the office as a new patient to establish care and get his yearly physical.  Diagnoses and orders as below. Overall patient is doing well, we will plan to follow-up in 1 year. Will call patient with lab results. Plan to see back sooner if lab results are abnormal.  2. Celiac disease  -     CBC with Auto Differential; Future  -     Vitamin D 25 Hydroxy; Future  -     Ferritin; Future  -     Iron; Future  -     IRON SATURATION; Future  -     Magnesium; Future  -     Vitamin B12; Future  -     Folate; Future  Chronic, stable  Patient is a long history of celiac disease that was diagnosed approximately  with EGD/biopsy, lab work. Patient is currently well controlled with dietary modification/gluten-free diet. Continue gluten-free diet no concerns this time. We will check labs as above for nutrient deficiencies. 3. Class 1 obesity due to excess calories without serious comorbidity with body mass index (BMI) of 30.0 to 30.9 in adult  -     Lipid Panel; Future  -     Hemoglobin A1C; Future  -     TSH with Reflex; Future  Chronic, uncontrolled  BMI today 30.24  We will check blood work and follow-up. Patient to work on lifestyle modification including diet and exercise to help with weight loss. 4. Screening for human immunodeficiency virus  -     HIV Screen; Future  Due for screening  5. Encounter for HCV screening test for low risk patient  -     Hepatitis C Antibody; Future  Due for screening  6. Depression screening  PHQ-9 Total Score: 0 (2022  7:27 AM)  Negative depression screen.     7. Screening PSA (prostate specific antigen)  -     PSA Screening; scans performed after his diagnosis. Which was approximately 2-3 times. Patient states they were all within normal limits and denies any concerns. Medications were reviewed in detail. Concerns about medications today: No current medications.     Preventative care discussed: Depression screen, hep C screening, HIV screening, PSA screening    Specialists: No current specialist.    Past Medical History:   Diagnosis Date    Acute right flank pain     Obstruction of right ureter     Urinary tract infection without hematuria        Past Surgical History:   Procedure Laterality Date    CHOLECYSTECTOMY      ENDOSCOPY, COLON, DIAGNOSTIC  2016    Dr Ru Palomo OTHER SURGICAL HISTORY  11/26/13    [facility] Internal Urethrotomy - Dr. Perez Course      tear    VASECTOMY  2016       Family History   Problem Relation Age of Onset    High Blood Pressure Mother     Arthritis Mother     Hearing Loss Mother     Miscarriages / [place] Mother     High Blood Pressure Father     Arthritis Father     Diabetes Paternal Uncle     Diabetes Paternal Grandmother     Alcohol Abuse Paternal Grandmother     Heart Disease Paternal Grandmother     Coronary Art Dis Paternal Grandmother     Arthritis Sister     Miscarriages / [place] Sister     Stroke Maternal Grandfather     Learning Disabilities Daughter     Allergy (Severe) Neg Hx     Anemia Neg Hx     Arrhythmia Neg Hx     Atrial Fibrillation Neg Hx     Asthma Neg Hx     Birth Defects Neg Hx     Breast Cancer Neg Hx     Colon Cancer Neg Hx     Prostate Cancer Neg Hx     Cancer Neg Hx     Depression Neg Hx     Early Death Neg Hx     Heart Attack Neg Hx     Kidney Disease Neg Hx     Mental Illness Neg Hx     Mental Retardation Neg Hx     Obesity Neg Hx     Osteoporosis Neg Hx     Substance Abuse Neg Hx     Vision Loss Neg Hx        Social History     Tobacco Use    Smoking status: Never Smoker    Smokeless tobacco: Former User Types: Chew   Vaping Use    Vaping Use: Never used   Substance Use Topics    Alcohol use: No    Drug use: No         Current Outpatient Medications:     Zinc Sulfate (ZINC 15 PO), Take by mouth, Disp: , Rfl:     vitamin D (CHOLECALCIFEROL) 1000 UNIT TABS tablet, Take by mouth daily Indications: 5000 untis daily, Disp: , Rfl:     Allergies   Allergen Reactions    Wheat Bran Diarrhea       Review of Systems   Constitutional: Negative for fatigue and fever. HENT: Negative for congestion, postnasal drip and rhinorrhea. Eyes: Negative for pain and redness. Respiratory: Negative for cough and shortness of breath. Cardiovascular: Negative for chest pain and palpitations. Gastrointestinal: Negative for abdominal pain, constipation, diarrhea, nausea and vomiting. Celiac's   Genitourinary: Negative for difficulty urinating and dysuria. Skin: Negative for pallor and rash. Neurological: Negative for dizziness and headaches. Hematological: Does not bruise/bleed easily. Psychiatric/Behavioral: Negative for dysphoric mood. The patient is not nervous/anxious. Objective   Vitals:    06/28/22 0727   BP: 118/82   Pulse: 65   Resp: 16   Temp: 96.9 °F (36.1 °C)   SpO2: 95%     Physical Exam  Vitals and nursing note reviewed. Constitutional:       General: He is not in acute distress. Appearance: Normal appearance. Eyes:      General:         Right eye: No discharge. Left eye: No discharge. Conjunctiva/sclera: Conjunctivae normal.   Cardiovascular:      Rate and Rhythm: Normal rate and regular rhythm. Pulses: Normal pulses. Heart sounds: No murmur heard. Pulmonary:      Effort: Pulmonary effort is normal. No respiratory distress. Breath sounds: Normal breath sounds. Abdominal:      General: Bowel sounds are normal. There is no distension. Palpations: Abdomen is soft. Tenderness: There is no abdominal tenderness. There is no guarding.

## 2022-07-07 ENCOUNTER — NURSE ONLY (OUTPATIENT)
Dept: FAMILY MEDICINE CLINIC | Age: 42
End: 2022-07-07
Payer: COMMERCIAL

## 2022-07-07 ENCOUNTER — HOSPITAL ENCOUNTER (OUTPATIENT)
Age: 42
Setting detail: SPECIMEN
Discharge: HOME | End: 2022-07-07

## 2022-07-07 DIAGNOSIS — Z11.59 ENCOUNTER FOR HCV SCREENING TEST FOR LOW RISK PATIENT: ICD-10-CM

## 2022-07-07 DIAGNOSIS — Z00.00 WELL ADULT EXAM: ICD-10-CM

## 2022-07-07 DIAGNOSIS — E66.09 EXOGENOUS OBESITY: ICD-10-CM

## 2022-07-07 DIAGNOSIS — Z12.5 SCREENING PSA (PROSTATE SPECIFIC ANTIGEN): ICD-10-CM

## 2022-07-07 DIAGNOSIS — Z11.4 SCREENING FOR HUMAN IMMUNODEFICIENCY VIRUS: ICD-10-CM

## 2022-07-07 DIAGNOSIS — K90.0 CELIAC DISEASE: ICD-10-CM

## 2022-07-07 DIAGNOSIS — E66.09 CLASS 1 OBESITY DUE TO EXCESS CALORIES WITHOUT SERIOUS COMORBIDITY WITH BODY MASS INDEX (BMI) OF 30.0 TO 30.9 IN ADULT: ICD-10-CM

## 2022-07-07 DIAGNOSIS — K90.9 INTESTINAL MALABSORPTION, UNSPECIFIED TYPE: ICD-10-CM

## 2022-07-07 DIAGNOSIS — Z12.5 SPECIAL SCREENING FOR MALIGNANT NEOPLASM OF PROSTATE: ICD-10-CM

## 2022-07-07 LAB
ABSOLUTE EOS #: 0.08 K/UL (ref 0–0.44)
ABSOLUTE IMMATURE GRANULOCYTE: <0.03 K/UL (ref 0–0.3)
ABSOLUTE LYMPH #: 1.92 K/UL (ref 1.1–3.7)
ABSOLUTE MONO #: 0.57 K/UL (ref 0.1–1.2)
BASOPHILS # BLD: 1 % (ref 0–2)
BASOPHILS ABSOLUTE: 0.04 K/UL (ref 0–0.2)
EOSINOPHILS RELATIVE PERCENT: 1 % (ref 1–4)
HCT VFR BLD CALC: 49.2 % (ref 40.7–50.3)
HEMOGLOBIN: 16.4 G/DL (ref 13–17)
IMMATURE GRANULOCYTES: 0 %
LYMPHOCYTES # BLD: 34 % (ref 24–43)
MCH RBC QN AUTO: 30.3 PG (ref 25.2–33.5)
MCHC RBC AUTO-ENTMCNC: 33.3 G/DL (ref 28.4–34.8)
MCV RBC AUTO: 90.8 FL (ref 82.6–102.9)
MONOCYTES # BLD: 10 % (ref 3–12)
NRBC AUTOMATED: 0 PER 100 WBC
PDW BLD-RTO: 12.9 % (ref 11.8–14.4)
PLATELET # BLD: 212 K/UL (ref 138–453)
PMV BLD AUTO: 10.4 FL (ref 8.1–13.5)
RBC # BLD: 5.42 M/UL (ref 4.21–5.77)
SEG NEUTROPHILS: 54 % (ref 36–65)
SEGMENTED NEUTROPHILS ABSOLUTE COUNT: 3.09 K/UL (ref 1.5–8.1)
WBC # BLD: 5.7 K/UL (ref 3.5–11.3)

## 2022-07-07 PROCEDURE — 36415 COLL VENOUS BLD VENIPUNCTURE: CPT | Performed by: NURSE PRACTITIONER

## 2022-07-08 LAB
ALBUMIN SERPL-MCNC: 4.5 G/DL (ref 3.5–5.2)
ALBUMIN/GLOBULIN RATIO: 1.6 (ref 1–2.5)
ALP BLD-CCNC: 68 U/L (ref 40–129)
ALT SERPL-CCNC: 45 U/L (ref 5–41)
ANION GAP SERPL CALCULATED.3IONS-SCNC: 8 MMOL/L (ref 9–17)
AST SERPL-CCNC: 31 U/L
BILIRUB SERPL-MCNC: 0.4 MG/DL (ref 0.3–1.2)
BUN BLDV-MCNC: 16 MG/DL (ref 6–20)
CALCIUM SERPL-MCNC: 9.7 MG/DL (ref 8.6–10.4)
CHLORIDE BLD-SCNC: 102 MMOL/L (ref 98–107)
CHOLESTEROL/HDL RATIO: 5.1
CHOLESTEROL: 174 MG/DL
CO2: 26 MMOL/L (ref 20–31)
CREAT SERPL-MCNC: 0.74 MG/DL (ref 0.7–1.2)
ESTIMATED AVERAGE GLUCOSE: 114 MG/DL
FERRITIN: 224 NG/ML (ref 30–400)
FOLATE: 6.3 NG/ML
GFR AFRICAN AMERICAN: >60 ML/MIN
GFR NON-AFRICAN AMERICAN: >60 ML/MIN
GFR SERPL CREATININE-BSD FRML MDRD: ABNORMAL ML/MIN/{1.73_M2}
GLUCOSE BLD-MCNC: 83 MG/DL (ref 70–99)
HBA1C MFR BLD: 5.6 % (ref 4–6)
HDLC SERPL-MCNC: 34 MG/DL
HEPATITIS C ANTIBODY: NONREACTIVE
HIV AG/AB: NONREACTIVE
IRON: 114 UG/DL (ref 59–158)
LDL CHOLESTEROL: 109 MG/DL (ref 0–130)
MAGNESIUM: 2.2 MG/DL (ref 1.6–2.6)
POTASSIUM SERPL-SCNC: 4.8 MMOL/L (ref 3.7–5.3)
PROSTATE SPECIFIC ANTIGEN: 0.39 NG/ML
SODIUM BLD-SCNC: 136 MMOL/L (ref 135–144)
TOTAL PROTEIN: 7.4 G/DL (ref 6.4–8.3)
TRIGL SERPL-MCNC: 156 MG/DL
TSH SERPL DL<=0.05 MIU/L-ACNC: 1.83 UIU/ML (ref 0.3–5)
VITAMIN B-12: 594 PG/ML (ref 232–1245)
VITAMIN D 25-HYDROXY: 23.3 NG/ML

## 2022-10-20 ENCOUNTER — OFFICE VISIT (OUTPATIENT)
Dept: FAMILY MEDICINE CLINIC | Age: 42
End: 2022-10-20
Payer: COMMERCIAL

## 2022-10-20 VITALS
DIASTOLIC BLOOD PRESSURE: 64 MMHG | RESPIRATION RATE: 12 BRPM | HEART RATE: 70 BPM | OXYGEN SATURATION: 96 % | TEMPERATURE: 96.9 F | SYSTOLIC BLOOD PRESSURE: 120 MMHG | WEIGHT: 226.2 LBS | BODY MASS INDEX: 30.68 KG/M2

## 2022-10-20 DIAGNOSIS — K64.5 INTERNAL THROMBOSED HEMORRHOIDS: Primary | ICD-10-CM

## 2022-10-20 PROCEDURE — 99213 OFFICE O/P EST LOW 20 MIN: CPT | Performed by: STUDENT IN AN ORGANIZED HEALTH CARE EDUCATION/TRAINING PROGRAM

## 2022-10-20 RX ORDER — HYDROCORTISONE ACETATE 25 MG/1
25 SUPPOSITORY RECTAL EVERY 12 HOURS
Qty: 28 SUPPOSITORY | Refills: 0 | Status: SHIPPED | OUTPATIENT
Start: 2022-10-20 | End: 2022-11-03

## 2022-10-20 ASSESSMENT — PATIENT HEALTH QUESTIONNAIRE - PHQ9
SUM OF ALL RESPONSES TO PHQ QUESTIONS 1-9: 0
SUM OF ALL RESPONSES TO PHQ QUESTIONS 1-9: 0
1. LITTLE INTEREST OR PLEASURE IN DOING THINGS: 0
SUM OF ALL RESPONSES TO PHQ9 QUESTIONS 1 & 2: 0
2. FEELING DOWN, DEPRESSED OR HOPELESS: 0
SUM OF ALL RESPONSES TO PHQ QUESTIONS 1-9: 0
SUM OF ALL RESPONSES TO PHQ QUESTIONS 1-9: 0

## 2022-10-20 ASSESSMENT — ENCOUNTER SYMPTOMS
DIARRHEA: 0
COUGH: 0
SHORTNESS OF BREATH: 0
VOMITING: 0
RECTAL PAIN: 1
NAUSEA: 0
ABDOMINAL PAIN: 0
CONSTIPATION: 0

## 2022-10-20 NOTE — LETTER
[address]  [facility] [place] [address] [facility]  Phone: [phone]  Fax: [phone]    Marcelo King DO        October 20, 2022     Patient: Aziza Magdaleno   YOB: 1980   Date of Visit: 10/20/2022       To Whom It May Concern: It is my medical opinion that [facility] may return to work on 10-20-22 after appointment. If you have any questions or concerns, please don't hesitate to call.     Sincerely,        Marcelo King DO

## 2022-10-20 NOTE — PROGRESS NOTES
Daisy Still (:  1980) is a 38 y.o. male,Established patient, here for evaluation of the following chief complaint(s):  Health Maintenance (Vaccines ) and Hemorrhoids (Possible - has had it before but would like to get it checked out. Not going away. Noticed about 3 weeks ago )         ASSESSMENT/PLAN:  1. Internal thrombosed hemorrhoids  -     hydrocortisone (ANUSOL-HC) 25 MG suppository; Place 1 suppository rectally in the morning and 1 suppository in the evening. Do all this for 14 days. , Disp-28 suppository, R-[de-identified]ormal  47-year-old male presents the office for concerns of hemorrhoids. Patient has thrombosed internal hemorrhoid at the 3 o'clock position with no active bleeding or drainage. Continue with Preparation H and warm sitz baths after every defecation. We will start Anusol suppository as above. If symptoms do not resolve over the next couple weeks patient may be a candidate for surgical excision. Will call back if symptoms worsen or do not improve. Return if symptoms worsen or fail to improve. Subjective   SUBJECTIVE/OBJECTIVE:  HPI  47-year-old male presents the office for concern of hemorrhoid. Patient states he has a pertinent history of celiac's disease and has had hemorrhoids before in the past when he has a flare [place] constipation or diarrhea. Patient states he had a mild flare secondary to eating out approximate 3 to 4 weeks ago which caused some pain/discomfort with defecation. Patient denies any active bleeding, black tarry stools, blood in stool. Patient states pain is worse right after he uses the restroom/defecates. He is been using Preparation H after defecation as well as the wipes. He has not tried any sitz bath or Epson salt baths.       Past Medical History:   Diagnosis Date    Acute right flank pain     Hydroureteronephrosis     Kidney stones 2019    Obstruction of right ureter     Urethral stricture 2013    Optical Internal Urethrotomy 11/26/13 Looney removed on 12/1/13     Urinary tract infection without hematuria        Past Surgical History:   Procedure Laterality Date    CHOLECYSTECTOMY      ENDOSCOPY, COLON, DIAGNOSTIC  2016    Dr Anitha Peters HISTORY  11/26/13    [facility] Internal Urethrotomy - Dr. Estrada Lindsey      tear    VASECTOMY  2016       Family History   Problem Relation Age of Onset    High Blood Pressure Mother     Arthritis Mother     Hearing Loss Mother     Miscarriages / Stillbirths Mother     High Blood Pressure Father     Arthritis Father     Diabetes Paternal Uncle     Diabetes Paternal Grandmother     Alcohol Abuse Paternal Grandmother     Heart Disease Paternal Grandmother     Coronary Art Dis Paternal Grandmother     Arthritis Sister     Miscarriages / [place] Sister     Stroke Maternal Grandfather     Learning Disabilities Daughter     Allergy (Severe) Neg Hx     Anemia Neg Hx     Arrhythmia Neg Hx     Atrial Fibrillation Neg Hx     Asthma Neg Hx     Birth Defects Neg Hx     Breast Cancer Neg Hx     Colon Cancer Neg Hx     Prostate Cancer Neg Hx     Cancer Neg Hx     Depression Neg Hx     Early Death Neg Hx     Heart Attack Neg Hx     Kidney Disease Neg Hx     Mental Illness Neg Hx     Mental Retardation Neg Hx     Obesity Neg Hx     Osteoporosis Neg Hx     Substance Abuse Neg Hx     Vision Loss Neg Hx        Social History     Tobacco Use    Smoking status: Never    Smokeless tobacco: Former     Types: Chew   Vaping Use    Vaping Use: Never used   Substance Use Topics    Alcohol use: No    Drug use: No         Current Outpatient Medications:     hydrocortisone (ANUSOL-HC) 25 MG suppository, Place 1 suppository rectally in the morning and 1 suppository in the evening. Do all this for 14 days. , Disp: 28 suppository, Rfl: 0    Zinc Sulfate (ZINC 15 PO), Take by mouth, Disp: , Rfl:     vitamin D (CHOLECALCIFEROL) 1000 UNIT TABS tablet, Take by mouth daily Indications: 5000 untis daily, Disp: , Rfl:     Allergies   Allergen Reactions    Wheat Bran Diarrhea       Review of Systems   Constitutional:  Negative for fatigue and fever. Respiratory:  Negative for cough and shortness of breath. Cardiovascular:  Negative for chest pain and palpitations. Gastrointestinal:  Positive for rectal pain (with defecation). Negative for abdominal pain, constipation, diarrhea, nausea and vomiting. Genitourinary:  Negative for difficulty urinating and dysuria. Skin:  Negative for pallor and rash. Objective   Vitals:    10/20/22 0723   BP: 120/64   Pulse: 70   Resp: 12   Temp: 96.9 °F (36.1 °C)   SpO2: 96%     Physical Exam  Vitals and nursing note reviewed. Exam conducted with a chaperone present. Constitutional:       General: He is not in acute distress. Appearance: Normal appearance. Cardiovascular:      Rate and Rhythm: Normal rate and regular rhythm. Pulses: Normal pulses. Heart sounds: No murmur heard. Pulmonary:      Effort: Pulmonary effort is normal. No respiratory distress. Breath sounds: Normal breath sounds. Genitourinary:     Rectum: Tenderness (over hemorrhoid) and internal hemorrhoid (3 oclock position thrombosed) present. No anal fissure or external hemorrhoid. Normal anal tone. Neurological:      Mental Status: He is alert. Psychiatric:         Mood and Affect: Mood normal.         Behavior: Behavior normal.         Thought Content: Thought content normal.         Judgment: Judgment normal.                An electronic signature was used to authenticate this note. --Kasey Cabello DO          **This report has been created using voice recognition software. It may contain minor errors which are inherent in voice recognition technology. **

## 2023-05-10 ENCOUNTER — HOSPITAL ENCOUNTER (EMERGENCY)
Age: 43
Discharge: HOME | End: 2023-05-10

## 2023-05-10 VITALS
HEART RATE: 75 BPM | OXYGEN SATURATION: 100 % | SYSTOLIC BLOOD PRESSURE: 140 MMHG | DIASTOLIC BLOOD PRESSURE: 60 MMHG | RESPIRATION RATE: 16 BRPM | TEMPERATURE: 98.2 F

## 2023-05-10 DIAGNOSIS — J01.40 ACUTE PANSINUSITIS, RECURRENCE NOT SPECIFIED: Primary | ICD-10-CM

## 2023-05-10 PROCEDURE — 99203 OFFICE O/P NEW LOW 30 MIN: CPT | Performed by: NURSE PRACTITIONER

## 2023-05-10 PROCEDURE — 99213 OFFICE O/P EST LOW 20 MIN: CPT

## 2023-05-10 RX ORDER — AMOXICILLIN AND CLAVULANATE POTASSIUM 875; 125 MG/1; MG/1
1 TABLET, FILM COATED ORAL 2 TIMES DAILY
Qty: 20 TABLET | Refills: 0 | Status: SHIPPED | OUTPATIENT
Start: 2023-05-10 | End: 2023-05-20

## 2023-05-10 ASSESSMENT — ENCOUNTER SYMPTOMS
CHEST TIGHTNESS: 0
SHORTNESS OF BREATH: 0
SORE THROAT: 0
SINUS PRESSURE: 1
RHINORRHEA: 1
COUGH: 0
NAUSEA: 0
VOMITING: 0
DIARRHEA: 0

## 2023-05-10 NOTE — ED PROVIDER NOTES
Endoscopy, colon, diagnostic (2016). CURRENT MEDICATIONS       Previous Medications    VITAMIN D (CHOLECALCIFEROL) 1000 UNIT TABS TABLET    Take by mouth daily Indications: 5000 untis daily    ZINC SULFATE (ZINC 15 PO)    Take by mouth       ALLERGIES     Patient is is allergic to wheat bran. Patients   Immunization History   Administered Date(s) Administered    TDaP, ADACEL (age 10y-63y), Candy Camilo (age 10y+), IM, 0.5mL 03/11/2019       FAMILY HISTORY     Patient's family history includes Alcohol Abuse in his paternal grandmother; Arthritis in his father, mother, and sister; Coronary Art Dis in his paternal grandmother; Diabetes in his paternal grandmother and paternal uncle; Hearing Loss in his mother; Heart Disease in his paternal grandmother; High Blood Pressure in his father and mother; Learning Disabilities in his daughter; [place]  / [place] in his mother and sister; Stroke in his maternal grandfather. SOCIAL HISTORY     Patient  reports that he has never smoked. He has quit using smokeless tobacco.  His smokeless tobacco use included chew. He reports that he does not drink alcohol and does not use drugs. PHYSICAL EXAM     ED TRIAGE VITALS  BP: (!) 140/60, Temp: 98.2 °F (36.8 °C), Pulse: 75, Respirations: 16, SpO2: 100 %,Estimated body mass index is 30.68 kg/m² as calculated from the following:    Height as of 6/28/22: 6' (1.829 m). Weight as of 10/20/22: 226 lb 3.2 oz (102.6 kg). ,No LMP for male patient. Physical Exam  Vitals and nursing note reviewed. Constitutional:       General: He is not in acute distress. Appearance: Normal appearance. He is not ill-appearing, toxic-appearing or diaphoretic. HENT:      Head: Normocephalic. Right Ear: Ear canal and external ear normal.      Left Ear: Ear canal and external ear normal.      Nose: No congestion or rhinorrhea. Right Sinus: Maxillary sinus tenderness and frontal sinus tenderness present.       Left Sinus: Maxillary

## 2023-06-29 ENCOUNTER — OFFICE VISIT (OUTPATIENT)
Dept: FAMILY MEDICINE CLINIC | Age: 43
End: 2023-06-29

## 2023-06-29 VITALS
TEMPERATURE: 97.3 F | BODY MASS INDEX: 30.48 KG/M2 | RESPIRATION RATE: 16 BRPM | WEIGHT: 225 LBS | SYSTOLIC BLOOD PRESSURE: 112 MMHG | OXYGEN SATURATION: 99 % | HEIGHT: 72 IN | DIASTOLIC BLOOD PRESSURE: 80 MMHG | HEART RATE: 62 BPM

## 2023-06-29 DIAGNOSIS — E66.09 CLASS 1 OBESITY DUE TO EXCESS CALORIES WITHOUT SERIOUS COMORBIDITY WITH BODY MASS INDEX (BMI) OF 30.0 TO 30.9 IN ADULT: ICD-10-CM

## 2023-06-29 DIAGNOSIS — E78.1 HYPERTRIGLYCERIDEMIA: ICD-10-CM

## 2023-06-29 DIAGNOSIS — R79.89 ELEVATED LFTS: ICD-10-CM

## 2023-06-29 DIAGNOSIS — K90.0 CELIAC DISEASE: ICD-10-CM

## 2023-06-29 DIAGNOSIS — Z00.00 WELL ADULT EXAM: Primary | ICD-10-CM

## 2023-06-29 DIAGNOSIS — E55.9 VITAMIN D DEFICIENCY: ICD-10-CM

## 2023-06-29 DIAGNOSIS — Z12.11 COLON CANCER SCREENING: ICD-10-CM

## 2023-06-29 DIAGNOSIS — Z13.31 DEPRESSION SCREEN: ICD-10-CM

## 2023-06-29 PROCEDURE — G0444 DEPRESSION SCREEN ANNUAL: HCPCS | Performed by: STUDENT IN AN ORGANIZED HEALTH CARE EDUCATION/TRAINING PROGRAM

## 2023-06-29 PROCEDURE — 99396 PREV VISIT EST AGE 40-64: CPT | Performed by: STUDENT IN AN ORGANIZED HEALTH CARE EDUCATION/TRAINING PROGRAM

## 2023-06-29 SDOH — ECONOMIC STABILITY: INCOME INSECURITY: HOW HARD IS IT FOR YOU TO PAY FOR THE VERY BASICS LIKE FOOD, HOUSING, MEDICAL CARE, AND HEATING?: NOT HARD AT ALL

## 2023-06-29 SDOH — ECONOMIC STABILITY: FOOD INSECURITY: WITHIN THE PAST 12 MONTHS, YOU WORRIED THAT YOUR FOOD WOULD RUN OUT BEFORE YOU GOT MONEY TO BUY MORE.: NEVER TRUE

## 2023-06-29 SDOH — ECONOMIC STABILITY: FOOD INSECURITY: WITHIN THE PAST 12 MONTHS, THE FOOD YOU BOUGHT JUST DIDN'T LAST AND YOU DIDN'T HAVE MONEY TO GET MORE.: NEVER TRUE

## 2023-06-29 SDOH — ECONOMIC STABILITY: HOUSING INSECURITY
IN THE LAST 12 MONTHS, WAS THERE A TIME WHEN YOU DID NOT HAVE A STEADY PLACE TO SLEEP OR SLEPT IN A SHELTER (INCLUDING NOW)?: NO

## 2023-06-29 ASSESSMENT — PATIENT HEALTH QUESTIONNAIRE - PHQ9
SUM OF ALL RESPONSES TO PHQ QUESTIONS 1-9: 0
SUM OF ALL RESPONSES TO PHQ9 QUESTIONS 1 & 2: 0
2. FEELING DOWN, DEPRESSED OR HOPELESS: 0
SUM OF ALL RESPONSES TO PHQ QUESTIONS 1-9: 0
1. LITTLE INTEREST OR PLEASURE IN DOING THINGS: 0

## 2023-06-29 ASSESSMENT — ENCOUNTER SYMPTOMS
VOMITING: 0
EYE PAIN: 0
COUGH: 0
EYE REDNESS: 0
NAUSEA: 0
SHORTNESS OF BREATH: 0
CONSTIPATION: 0
RHINORRHEA: 0
ABDOMINAL PAIN: 0
DIARRHEA: 0

## 2023-07-14 ENCOUNTER — HOSPITAL ENCOUNTER (OUTPATIENT)
Age: 43
Setting detail: SPECIMEN
Discharge: HOME | End: 2023-07-14

## 2023-07-14 ENCOUNTER — NURSE ONLY (OUTPATIENT)
Dept: FAMILY MEDICINE CLINIC | Age: 43
End: 2023-07-14

## 2023-07-14 DIAGNOSIS — E78.1 HYPERTRIGLYCERIDEMIA: Primary | ICD-10-CM

## 2023-07-14 DIAGNOSIS — E55.9 VITAMIN D DEFICIENCY: ICD-10-CM

## 2023-07-14 DIAGNOSIS — E78.1 HYPERTRIGLYCERIDEMIA: ICD-10-CM

## 2023-07-14 DIAGNOSIS — E66.9 CLASS 1 OBESITY: ICD-10-CM

## 2023-07-14 DIAGNOSIS — E66.09 CLASS 1 OBESITY DUE TO EXCESS CALORIES WITHOUT SERIOUS COMORBIDITY WITH BODY MASS INDEX (BMI) OF 30.0 TO 30.9 IN ADULT: ICD-10-CM

## 2023-07-14 LAB
25(OH)D3 SERPL-MCNC: 23.2 NG/ML
ALBUMIN SERPL-MCNC: 4.5 G/DL (ref 3.5–5.2)
ALBUMIN/GLOB SERPL: 1.5 {RATIO} (ref 1–2.5)
ALP SERPL-CCNC: 64 U/L (ref 40–129)
ALT SERPL-CCNC: 45 U/L (ref 5–41)
ANION GAP SERPL CALCULATED.3IONS-SCNC: 13 MMOL/L (ref 9–17)
AST SERPL-CCNC: 31 U/L
BASOPHILS # BLD: 0.04 K/UL (ref 0–0.2)
BASOPHILS NFR BLD: 1 % (ref 0–2)
BILIRUB DIRECT SERPL-MCNC: 0.1 MG/DL
BILIRUB INDIRECT SERPL-MCNC: 0.4 MG/DL (ref 0–1)
BILIRUB SERPL-MCNC: 0.5 MG/DL (ref 0.3–1.2)
BUN SERPL-MCNC: 21 MG/DL (ref 6–20)
CALCIUM SERPL-MCNC: 9.5 MG/DL (ref 8.6–10.4)
CHLORIDE SERPL-SCNC: 103 MMOL/L (ref 98–107)
CHOLEST SERPL-MCNC: 180 MG/DL
CHOLESTEROL/HDL RATIO: 5
CO2 SERPL-SCNC: 23 MMOL/L (ref 20–31)
CREAT SERPL-MCNC: 0.8 MG/DL (ref 0.7–1.2)
EOSINOPHIL # BLD: 0.04 K/UL (ref 0–0.44)
EOSINOPHILS RELATIVE PERCENT: 1 % (ref 1–4)
ERYTHROCYTE [DISTWIDTH] IN BLOOD BY AUTOMATED COUNT: 12.8 % (ref 11.8–14.4)
GFR SERPL CREATININE-BSD FRML MDRD: >60 ML/MIN/1.73M2
GLUCOSE SERPL-MCNC: 73 MG/DL (ref 70–99)
HCT VFR BLD AUTO: 51.2 % (ref 40.7–50.3)
HDLC SERPL-MCNC: 36 MG/DL
HGB BLD-MCNC: 16.7 G/DL (ref 13–17)
IMM GRANULOCYTES # BLD AUTO: <0.03 K/UL (ref 0–0.3)
IMM GRANULOCYTES NFR BLD: 0 %
LDLC SERPL CALC-MCNC: 122 MG/DL (ref 0–130)
LYMPHOCYTES # BLD: 36 % (ref 24–43)
LYMPHOCYTES NFR BLD: 1.88 K/UL (ref 1.1–3.7)
MCH RBC QN AUTO: 30.4 PG (ref 25.2–33.5)
MCHC RBC AUTO-ENTMCNC: 32.6 G/DL (ref 28.4–34.8)
MCV RBC AUTO: 93.3 FL (ref 82.6–102.9)
MONOCYTES NFR BLD: 0.54 K/UL (ref 0.1–1.2)
MONOCYTES NFR BLD: 10 % (ref 3–12)
NEUTROPHILS NFR BLD: 52 % (ref 36–65)
NEUTS SEG NFR BLD: 2.66 K/UL (ref 1.5–8.1)
NRBC BLD-RTO: 0 PER 100 WBC
PLATELET # BLD AUTO: 188 K/UL (ref 138–453)
PMV BLD AUTO: 10.6 FL (ref 8.1–13.5)
POTASSIUM SERPL-SCNC: 5.3 MMOL/L (ref 3.7–5.3)
PROT SERPL-MCNC: 7.5 G/DL (ref 6.4–8.3)
RBC # BLD AUTO: 5.49 M/UL (ref 4.21–5.77)
SODIUM SERPL-SCNC: 139 MMOL/L (ref 135–144)
TRIGL SERPL-MCNC: 112 MG/DL
WBC OTHER # BLD: 5.2 K/UL (ref 3.5–11.3)

## 2023-07-14 PROCEDURE — 36415 COLL VENOUS BLD VENIPUNCTURE: CPT | Performed by: STUDENT IN AN ORGANIZED HEALTH CARE EDUCATION/TRAINING PROGRAM

## 2023-07-14 PROCEDURE — 99999 PR OFFICE/OUTPT VISIT,PROCEDURE ONLY: CPT | Performed by: STUDENT IN AN ORGANIZED HEALTH CARE EDUCATION/TRAINING PROGRAM

## 2023-07-14 NOTE — PROGRESS NOTES
Venipuncture obtained from  left arm. Patient tolerated the procedure without complications or complaints.     1 PST   2 LVV

## 2023-09-14 ENCOUNTER — HOSPITAL ENCOUNTER (EMERGENCY)
Age: 43
Discharge: HOME | End: 2023-09-14
Payer: COMMERCIAL

## 2023-09-14 VITALS
TEMPERATURE: 97.6 F | OXYGEN SATURATION: 96 % | HEART RATE: 70 BPM | HEIGHT: 72 IN | BODY MASS INDEX: 29.8 KG/M2 | RESPIRATION RATE: 16 BRPM | WEIGHT: 220 LBS | SYSTOLIC BLOOD PRESSURE: 135 MMHG | DIASTOLIC BLOOD PRESSURE: 92 MMHG

## 2023-09-14 DIAGNOSIS — S60.552A: Primary | ICD-10-CM

## 2023-09-14 DIAGNOSIS — L08.9: Primary | ICD-10-CM

## 2023-09-14 PROCEDURE — 99213 OFFICE O/P EST LOW 20 MIN: CPT | Performed by: NURSE PRACTITIONER

## 2023-09-14 PROCEDURE — 99213 OFFICE O/P EST LOW 20 MIN: CPT

## 2023-09-14 RX ORDER — IBUPROFEN 400 MG/1
400 TABLET ORAL 3 TIMES DAILY
Qty: 15 TABLET | Refills: 0 | Status: SHIPPED | OUTPATIENT
Start: 2023-09-14 | End: 2023-09-19

## 2023-09-14 RX ORDER — CEFADROXIL 1000 MG/1
1 TABLET ORAL DAILY
Qty: 5 TABLET | Refills: 0 | Status: SHIPPED | OUTPATIENT
Start: 2023-09-14 | End: 2023-09-19

## 2023-09-14 ASSESSMENT — ENCOUNTER SYMPTOMS
WHEEZING: 0
CHOKING: 0
CHEST TIGHTNESS: 0
COUGH: 0
BACK PAIN: 0
ABDOMINAL PAIN: 0
SHORTNESS OF BREATH: 0
STRIDOR: 0
COLOR CHANGE: 0
APNEA: 0

## 2023-09-14 ASSESSMENT — PAIN - FUNCTIONAL ASSESSMENT: PAIN_FUNCTIONAL_ASSESSMENT: NONE - DENIES PAIN

## 2023-09-14 NOTE — ED NOTES
Patient presents to [facility] by self with complaints of evaluation of a previous splinter from x2  on left hand thumb side weeks prior. Patient denies pain, but states he has swelling.  Patient reports needing a work slip             Trish Sandoval RN  09/14/23 2399

## 2023-09-14 NOTE — ED PROVIDER NOTES
[facility]  Urgent Care Encounter      CHIEF COMPLAINT       Chief Complaint   Patient presents with    Wound Infection     PREVIOUS SPLINTER X2 WEEKS AGO        Nurses Notes reviewed and I agree except as noted in the HPI. HISTORY OFPRESENT ILLNESS   Kathy Serrato is a 37 y.o. The history is provided by the patient. No  was used. The history is provided by the patient. No  was used. Hand Injury  This is a new problem. The current episode started more than 1 week ago (Splinter injury occurred 2 weeks ago). The problem occurs daily. The problem has been gradually worsening. Pertinent negatives include no chest pain, no abdominal pain, no headaches and no shortness of breath. The symptoms are aggravated by bending and twisting (excessive hand movements). Nothing relieves the symptoms. He has tried a warm compress and rest for the symptoms. The treatment provided no relief. REVIEW OF SYSTEMS     Review of Systems  Constitutional:  Positive for activity change. Negative for appetite change, chills, diaphoresis, fatigue, fever and unexpected weight change. Respiratory:  Negative for apnea, cough, choking, chest tightness, shortness of breath, wheezing and stridor. Cardiovascular:  Negative for chest pain, palpitations and leg swelling. Gastrointestinal:  Negative for abdominal pain. Musculoskeletal:  Positive for joint swelling and myalgias. Negative for arthralgias, back pain, gait problem, neck pain and neck stiffness. Skin:  Positive for pallor and wound. Negative for color change and rash. Neurological:  Negative for headaches.    PAST MEDICAL HISTORY         Diagnosis Date    Acute right flank pain     Hydroureteronephrosis     Kidney stones 5/1/2019    Obstruction of right ureter     Urethral stricture 11/26/2013    Optical Internal Urethrotomy 11/26/13 Looney removed on 12/1/13     Urinary tract infection without hematuria

## 2024-01-27 NOTE — ED NOTES
ED nurse-to-nurse bedside report    Chief Complaint   Patient presents with    Chest Pain      LOC: alert and orientated to name, place, date  Vital signs   Vitals:    11/08/21 0627   BP: (!) 145/95   Pulse: 73   Resp: 18   Temp: 97.5 °F (36.4 °C)   TempSrc: Oral   SpO2: 100%   Weight: 220 lb (99.8 kg)   Height: 6' 1\" (1.854 m)      Pain:    Pain Interventions: none  Pain Goal: NA  Oxygen: No    Current needs required RA   Telemetry: Yes  LDAs:   Peripheral IV 11/08/21 Left Antecubital (Active)   Site Assessment Clean; Dry; Intact 11/08/21 0629   Line Status Blood return noted; Flushed; Specimen collected 11/08/21 0629   Dressing Status Clean; Dry; Intact 11/08/21 0629   Dressing Intervention New 11/08/21 0629     Continuous Infusions:   Mobility: Independent  [place] Fall Risk Score: No flowsheet data found.   Fall Interventions: side rails up, call light in reach, wheels locked, bed in lowest positione  Report given to: Rahat Quezada RN, Pashamn RN     Christian Huddleston RN  11/08/21 3933 room air

## 2024-10-15 ENCOUNTER — OFFICE VISIT (OUTPATIENT)
Dept: FAMILY MEDICINE CLINIC | Age: 44
End: 2024-10-15
Payer: COMMERCIAL

## 2024-10-15 VITALS
OXYGEN SATURATION: 97 % | HEIGHT: 72 IN | BODY MASS INDEX: 31.02 KG/M2 | WEIGHT: 229 LBS | TEMPERATURE: 96.9 F | RESPIRATION RATE: 16 BRPM | HEART RATE: 63 BPM | DIASTOLIC BLOOD PRESSURE: 86 MMHG | SYSTOLIC BLOOD PRESSURE: 130 MMHG

## 2024-10-15 DIAGNOSIS — E66.09 CLASS 1 OBESITY DUE TO EXCESS CALORIES WITHOUT SERIOUS COMORBIDITY WITH BODY MASS INDEX (BMI) OF 30.0 TO 30.9 IN ADULT: ICD-10-CM

## 2024-10-15 DIAGNOSIS — E78.6 LOW HDL (UNDER 40): ICD-10-CM

## 2024-10-15 DIAGNOSIS — Z12.5 SCREENING PSA (PROSTATE SPECIFIC ANTIGEN): ICD-10-CM

## 2024-10-15 DIAGNOSIS — E55.9 VITAMIN D DEFICIENCY: ICD-10-CM

## 2024-10-15 DIAGNOSIS — Z13.31 DEPRESSION SCREEN: ICD-10-CM

## 2024-10-15 DIAGNOSIS — Z00.00 ENCOUNTER FOR WELL ADULT EXAM WITHOUT ABNORMAL FINDINGS: Primary | ICD-10-CM

## 2024-10-15 DIAGNOSIS — R03.0 ELEVATED BLOOD PRESSURE READING: ICD-10-CM

## 2024-10-15 DIAGNOSIS — K90.0 CELIAC DISEASE: ICD-10-CM

## 2024-10-15 DIAGNOSIS — Z12.11 COLON CANCER SCREENING: ICD-10-CM

## 2024-10-15 DIAGNOSIS — E66.811 CLASS 1 OBESITY DUE TO EXCESS CALORIES WITHOUT SERIOUS COMORBIDITY WITH BODY MASS INDEX (BMI) OF 30.0 TO 30.9 IN ADULT: ICD-10-CM

## 2024-10-15 LAB
25(OH)D3 SERPL-MCNC: 21 NG/ML (ref 30–100)
ALBUMIN SERPL BCG-MCNC: 4.5 G/DL (ref 3.5–5.1)
ALP SERPL-CCNC: 71 U/L (ref 38–126)
ALT SERPL W/O P-5'-P-CCNC: 37 U/L (ref 11–66)
ANION GAP SERPL CALC-SCNC: 12 MEQ/L (ref 8–16)
AST SERPL-CCNC: 28 U/L (ref 5–40)
BASOPHILS ABSOLUTE: 0 THOU/MM3 (ref 0–0.1)
BASOPHILS NFR BLD AUTO: 0.7 %
BILIRUB SERPL-MCNC: 0.5 MG/DL (ref 0.3–1.2)
BUN SERPL-MCNC: 16 MG/DL (ref 7–22)
CALCIUM SERPL-MCNC: 9.7 MG/DL (ref 8.5–10.5)
CHLORIDE SERPL-SCNC: 103 MEQ/L (ref 98–111)
CHOLEST SERPL-MCNC: 175 MG/DL (ref 100–199)
CO2 SERPL-SCNC: 26 MEQ/L (ref 23–33)
CREAT SERPL-MCNC: 0.8 MG/DL (ref 0.4–1.2)
DEPRECATED RDW RBC AUTO: 40.8 FL (ref 35–45)
EOSINOPHIL NFR BLD AUTO: 0.9 %
EOSINOPHILS ABSOLUTE: 0 THOU/MM3 (ref 0–0.4)
ERYTHROCYTE [DISTWIDTH] IN BLOOD BY AUTOMATED COUNT: 12.4 % (ref 11.5–14.5)
GFR SERPL CREATININE-BSD FRML MDRD: > 90 ML/MIN/1.73M2
GLUCOSE SERPL-MCNC: 93 MG/DL (ref 70–108)
HCT VFR BLD AUTO: 49.2 % (ref 42–52)
HDLC SERPL-MCNC: 34 MG/DL
HGB BLD-MCNC: 16.5 GM/DL (ref 14–18)
IMM GRANULOCYTES # BLD AUTO: 0.01 THOU/MM3 (ref 0–0.07)
IMM GRANULOCYTES NFR BLD AUTO: 0.2 %
LDLC SERPL CALC-MCNC: 108 MG/DL
LYMPHOCYTES ABSOLUTE: 2.1 THOU/MM3 (ref 1–4.8)
LYMPHOCYTES NFR BLD AUTO: 39.1 %
MCH RBC QN AUTO: 30.3 PG (ref 26–33)
MCHC RBC AUTO-ENTMCNC: 33.5 GM/DL (ref 32.2–35.5)
MCV RBC AUTO: 90.4 FL (ref 80–94)
MONOCYTES ABSOLUTE: 0.6 THOU/MM3 (ref 0.4–1.3)
MONOCYTES NFR BLD AUTO: 10.9 %
NEUTROPHILS ABSOLUTE: 2.6 THOU/MM3 (ref 1.8–7.7)
NEUTROPHILS NFR BLD AUTO: 48.2 %
NRBC BLD AUTO-RTO: 0 /100 WBC
PLATELET # BLD AUTO: 249 THOU/MM3 (ref 130–400)
PMV BLD AUTO: 10.2 FL (ref 9.4–12.4)
POTASSIUM SERPL-SCNC: 5.2 MEQ/L (ref 3.5–5.2)
PROT SERPL-MCNC: 7.7 G/DL (ref 6.1–8)
PSA SERPL-MCNC: 0.6 NG/ML (ref 0–1)
RBC # BLD AUTO: 5.44 MILL/MM3 (ref 4.7–6.1)
SODIUM SERPL-SCNC: 141 MEQ/L (ref 135–145)
TRIGL SERPL-MCNC: 163 MG/DL (ref 0–199)
WBC # BLD AUTO: 5.3 THOU/MM3 (ref 4.8–10.8)

## 2024-10-15 PROCEDURE — G0444 DEPRESSION SCREEN ANNUAL: HCPCS | Performed by: STUDENT IN AN ORGANIZED HEALTH CARE EDUCATION/TRAINING PROGRAM

## 2024-10-15 PROCEDURE — 99396 PREV VISIT EST AGE 40-64: CPT | Performed by: STUDENT IN AN ORGANIZED HEALTH CARE EDUCATION/TRAINING PROGRAM

## 2024-10-15 SDOH — ECONOMIC STABILITY: FOOD INSECURITY: WITHIN THE PAST 12 MONTHS, YOU WORRIED THAT YOUR FOOD WOULD RUN OUT BEFORE YOU GOT MONEY TO BUY MORE.: NEVER TRUE

## 2024-10-15 SDOH — ECONOMIC STABILITY: INCOME INSECURITY: HOW HARD IS IT FOR YOU TO PAY FOR THE VERY BASICS LIKE FOOD, HOUSING, MEDICAL CARE, AND HEATING?: NOT HARD AT ALL

## 2024-10-15 SDOH — ECONOMIC STABILITY: FOOD INSECURITY: WITHIN THE PAST 12 MONTHS, THE FOOD YOU BOUGHT JUST DIDN'T LAST AND YOU DIDN'T HAVE MONEY TO GET MORE.: NEVER TRUE

## 2024-10-15 ASSESSMENT — ENCOUNTER SYMPTOMS
CONSTIPATION: 0
VOMITING: 0
SHORTNESS OF BREATH: 0
EYE REDNESS: 0
NAUSEA: 0
COUGH: 0
RHINORRHEA: 0
EYE PAIN: 0
ABDOMINAL PAIN: 0
DIARRHEA: 0

## 2024-10-15 ASSESSMENT — PATIENT HEALTH QUESTIONNAIRE - PHQ9
SUM OF ALL RESPONSES TO PHQ9 QUESTIONS 1 & 2: 0
SUM OF ALL RESPONSES TO PHQ QUESTIONS 1-9: 0
SUM OF ALL RESPONSES TO PHQ QUESTIONS 1-9: 0
2. FEELING DOWN, DEPRESSED OR HOPELESS: NOT AT ALL
SUM OF ALL RESPONSES TO PHQ QUESTIONS 1-9: 0
SUM OF ALL RESPONSES TO PHQ QUESTIONS 1-9: 0
1. LITTLE INTEREST OR PLEASURE IN DOING THINGS: NOT AT ALL

## 2024-10-15 NOTE — PROGRESS NOTES
Health Maintenance Due   Topic Date Due    Hepatitis B vaccine (1 of 3 - 19+ 3-dose series) Never done    Colorectal Cancer Screen  Never done    Depression Screen  06/29/2024    Flu vaccine (1) Never done    COVID-19 Vaccine (1 - 2023-24 season) Never done       
Venipuncture obtained from  right arm. Patient tolerated the procedure without complications or complaints.    
    Miscarriages / Stillbirths Sister     Stroke Maternal Grandfather     Learning Disabilities Daughter     Allergy (Severe) Neg Hx     Anemia Neg Hx     Arrhythmia Neg Hx     Atrial Fibrillation Neg Hx     Asthma Neg Hx     Birth Defects Neg Hx     Breast Cancer Neg Hx     Colon Cancer Neg Hx     Prostate Cancer Neg Hx     Cancer Neg Hx     Depression Neg Hx     Early Death Neg Hx     Heart Attack Neg Hx     Kidney Disease Neg Hx     Mental Illness Neg Hx     Mental Retardation Neg Hx     Obesity Neg Hx     Osteoporosis Neg Hx     Substance Abuse Neg Hx     Vision Loss Neg Hx        Social History     Tobacco Use    Smoking status: Never     Passive exposure: Never    Smokeless tobacco: Former     Types: Chew   Vaping Use    Vaping status: Never Used   Substance Use Topics    Alcohol use: No    Drug use: No       No current outpatient medications on file.    Allergies   Allergen Reactions    Wheat Diarrhea       Review of Systems   Constitutional:  Negative for fatigue and fever.   HENT:  Negative for congestion, postnasal drip and rhinorrhea.    Eyes:  Negative for pain and redness.   Respiratory:  Negative for cough and shortness of breath.    Cardiovascular:  Negative for chest pain and palpitations.   Gastrointestinal:  Negative for abdominal pain, constipation, diarrhea, nausea and vomiting.   Genitourinary:  Negative for difficulty urinating and dysuria.   Skin:  Negative for pallor and rash.   Neurological:  Negative for dizziness and headaches.   Hematological:  Does not bruise/bleed easily.   Psychiatric/Behavioral:  Negative for dysphoric mood. The patient is not nervous/anxious.           Objective   Vitals:    10/15/24 0734   BP: 130/86   Pulse:    Resp:    Temp:    SpO2:      Physical Exam  Vitals and nursing note reviewed.   Constitutional:       General: He is not in acute distress.     Appearance: Normal appearance.   Cardiovascular:      Rate and Rhythm: Normal rate and regular rhythm.

## 2024-10-15 NOTE — PATIENT INSTRUCTIONS
hands, brush your teeth twice a day, and wear a seat belt in the car.   Where can you learn more?  Go to [email or web address]://[email or web address].net/patientEd and enter P072 to learn more about \"Well Visit, Ages 18 to 65: Care Instructions.\"  Current as of: August 6, 2023  Content Version: 14.2  © 2024 [employer].   Care instructions adapted under license by [employer]. If you have questions about a medical condition or this instruction, always ask your healthcare professional. [employer] disclaims any warranty or liability for your use of this information.

## 2024-10-28 LAB — NONINV COLON CA DNA+OCC BLD SCRN STL QL: NEGATIVE
